# Patient Record
Sex: MALE | HISPANIC OR LATINO | URBAN - METROPOLITAN AREA
[De-identification: names, ages, dates, MRNs, and addresses within clinical notes are randomized per-mention and may not be internally consistent; named-entity substitution may affect disease eponyms.]

---
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
## 2021-01-03 ENCOUNTER — HEALTH MAINTENANCE LETTER (OUTPATIENT)
Age: 21
End: 2021-01-03

## 2021-01-07 DIAGNOSIS — L40.9 PSORIASIS: ICD-10-CM

## 2021-01-07 NOTE — TELEPHONE ENCOUNTER
[facility] Call Center    Phone Message    May a detailed message be left on voicemail: yes     Reason for Call: Medication Refill Request    Has the patient contacted the pharmacy for the refill? Yes     Name of medication being requested: Humira injection    Provider who prescribed the medication: Dr. Floyd    Pharmacy: [facility] - [place], [place] - [address]    Date medication is needed: OUT        Action Taken: Message routed to:  [facility] DERM    Travel Screening: Not Applicable

## 2021-01-07 NOTE — TELEPHONE ENCOUNTER
Humira injection      Patient has two active orders from the same day with two different doses. 10-    Last Office Visit : 10-  Future Office visit:  1-    Routing refill request to provider for review/approval because:  Not on protocol.  2 different doses active on medication list.      Kathleen M Doege RN

## 2021-01-08 NOTE — TELEPHONE ENCOUNTER
Medication refill request received and reviewed. Patient requesting refill of humira.  Last visit from 12/2020 was reviewed. At that point, plan was to continue on humira which patient had already started.  Will refill medication. Should not need a refill of starter pack    Sarah Beth Lara  PGY-4 Dermatology Resident  [facility] Department of Dermatology   [phone]

## 2021-01-14 ENCOUNTER — VIRTUAL VISIT (OUTPATIENT)
Dept: DERMATOLOGY | Facility: CLINIC | Age: 21
End: 2021-01-14
Payer: COMMERCIAL

## 2021-01-14 DIAGNOSIS — L63.9 ALOPECIA AREATA: ICD-10-CM

## 2021-01-14 DIAGNOSIS — L40.9 PSORIASIS: Primary | ICD-10-CM

## 2021-01-14 PROCEDURE — 99214 OFFICE O/P EST MOD 30 MIN: CPT | Mod: 95 | Performed by: STUDENT IN AN ORGANIZED HEALTH CARE EDUCATION/TRAINING PROGRAM

## 2021-01-14 ASSESSMENT — PAIN SCALES - GENERAL: PAINLEVEL: NO PAIN (0)

## 2021-01-14 NOTE — NURSING NOTE
Dermatology Rooming Note    Sulaiman Mccray's goals for this visit include:   Chief Complaint   Patient presents with     Derm Problem     Psoriasis - Sulaiman states he has been a little bit better      Carlito Barker, CMA

## 2021-01-14 NOTE — LETTER
1/14/2021       RE: uSlaiman Mccray  [address] [address]     Dear Colleague,    Thank you for referring your patient, Sulaiman Mccray, to the [facility]MAT[facility] [place] at [facility]. Please see a copy of my visit note below.    [facility] Dermatology Note  Encounter Date: Jan 14, 2021  [employer] and Telephone ([phone]). Location of teledermatologist: [facility]MATOLOGY [facility] [place]. Date of images: 01/14/21. Start time: 916. End time: 926.    Dermatology Problem List:    [facility]     1. Non-scarring hair loss (alopecia areata vs psoriatic alopecia)  - s/p punch bx 12/30/19 to scalp  - s/p ILK-10 scalp 10/22/20 (2cc)  - Current Tx: adalimumab for psoriasis  - Adjunct Tx: ferrous sulfate 325mg daily, vit C 100mg daily, and Vit D 50k units weekly x6wks (started 10/27/20)  - Prior tx: Doxycycline 100 mg BID (stopped 2/2 GI upset), hibiclens 3x/week, clobetasol 0.05% ointment/cream, triamcinolone 0.1% ointment, ILK qmonth x 8-9 months at [facility] [place] as well as allergic contact dermatitis sensitization  2. Psoriasis  - Punch bx on scalp noted the possibility of sebopsoriasis. Condition more apparent clinically 10/22/20  - Current Tx: triamcinolone 0.1% ointment BID  - Pending Tx: adalimumab               - Safety monitoring labs: stable              - Last QuantGold (10/22/20): negative  3. Bacterial superinfection on scalp  - Current Tx: BPO wash  - s/p Keflex 500mg BID   4. Elevated AST/ALT  - RUQ US pending    ____________________________________________    Assessment & Plan:  1. Psoriasis - subacute - stable  2. Non-scarring alopecia (alopecia areata vs psoriatic alopecia) - chronic, improving  Notable improvement with humira initiation. Plaques on scalp have thinned. He remains active on trunk. We discussed possibility of addition of nbUVB given guttate morphology on trunk;  however, given response, patient would like to consider and we can re-address at follow-up.  - Continue adalimumab q2w  - Continue triamcinolone 0.1% ointment BID to scalp  - Continue adjunct Tx: ferrous sulfate 325mg daily, vit C 100mg daily, and Vit D 50k units weekly x6wks (started 10/27/20)  - Reviewed baseline labs and stable w/ negative quant  - Consider nbUVB in future pending response to the above     3. Elevated LFTs  - Most likely related to fatty liver. Again recommended to follow up with RUQ US  - Pending results above, would consider GI consult     Follow-up in 12 weeks, earlier for new or changing lesions.      Staff Involved:  Patient was seen and staffed with attending physician Dr. Floyd     Procedures Performed:   None    Follow-up: 3 month(s) virtually (telephone with photos), or earlier for new or changing lesions    Staff and Resident:     Venancio Weinberg MD  Internal Medicine - Dermatology PGY 2  [phone]  IKristal,  was with the resident on the (phone/video) visit (for the critical and key portions or for 10 minutes) and agree with the resident's findings and plan of care as documented in the resident's note    ____________________________________________    CC: Derm Problem (Psoriasis - Sulaiman states he has been a little bit better )      HPI:  Mr. Sulaiman Mccray is a(n) 20 year old male who presents today as a return patient for psoriasis and ?psoriatic alopecia vs AA. Thinning of plaques with less scale. Agrees his body remains active. He notes no difficulty with the injections and no adverse reactions or recent infections. He is overall pleased with his progress.    Patient is otherwise feeling well, without additional concerns.    ROS: Skin Establ Pt: The patient denies any new rash, pruritus, or lesions that are symptomatic, changing or bleeding, except as per HPI.    Labs:  CBC, CMP, Quant reviewed.    Physical Exam:  SKIN: Teledermatology photos were reviewed;  image quality and interpretability: acceptable.   - Thinning of plaques on scalp; difficult to appreciate regrowth  - Trunk and extremities with well demarcated pink plaques and scattered psoriasiform guttate lesions - ?decreased thickness and scale but remains active  - No other lesions of concern on areas examined.     Medications:  Current Outpatient Medications   Medication     adalimumab (HUMIRA *CF*) 40 MG/0.4ML pen kit     benzoyl peroxide (PANOXYL) 10 % external liquid     cholecalciferol (VITAMIN D-1000 MAX ST) 1000 units TABS     ferrous sulfate (FEROSUL) 325 (65 Fe) MG tablet     fexofenadine (ALLEGRA) 180 MG tablet     triamcinolone (KENALOG) 0.1 % external ointment     vitamin C (ASCORBIC ACID) 100 MG tablet     adalimumab (HUMIRA *CF*) 80 MG/0.8ML pen kit     chlorhexidine (HIBICLENS) 4 % liquid     clobetasol (TEMOVATE) 0.05 % external ointment     triamcinolone (KENALOG) 0.1 % external ointment     Current Facility-Administered Medications   Medication     triamcinolone acetonide (KENALOG-10) injection 20 mg     triamcinolone acetonide (KENALOG-10) injection 20 mg     triamcinolone acetonide (KENALOG-10) injection 30 mg      Past Medical/Surgical History:   Patient Active Problem List   Diagnosis     ADHD (attention deficit hyperactivity disorder)     Alopecia areata     Anxiety     Major depressive disorder, single episode, mild (H)     Suicidal ideation     No past medical history on file.    CC Referred Self, MD  No address on file on close of this encounter.

## 2021-01-14 NOTE — PROGRESS NOTES
[facility] Dermatology Note  Encounter Date: Jan 14, 2021  Store-and-Forward and Telephone ([phone]). Location of teledermatologist: [facility] [place]. Date of images: 01/14/21. Start time: 916. End time: 926.    Dermatology Problem List:    We[facility]     1. Non-scarring hair loss (alopecia areata vs psoriatic alopecia)  - s/p punch bx 12/30/19 to scalp  - s/p ILK-10 scalp 10/22/20 (2cc)  - Current Tx: adalimumab for psoriasis  - Adjunct Tx: ferrous sulfate 325mg daily, vit C 100mg daily, and Vit D 50k units weekly x6wks (started 10/27/20)  - Prior tx: Doxycycline 100 mg BID (stopped 2/2 GI upset), hibiclens 3x/week, clobetasol 0.05% ointment/cream, triamcinolone 0.1% ointment, ILK qmonth x 8-9 months at [facility] [place] as well as allergic contact dermatitis sensitization  2. Psoriasis  - Punch bx on scalp noted the possibility of sebopsoriasis. Condition more apparent clinically 10/22/20  - Current Tx: triamcinolone 0.1% ointment BID  - Pending Tx: adalimumab               - Safety monitoring labs: stable              - Last QuantGold (10/22/20): negative  3. Bacterial superinfection on scalp  - Current Tx: BPO wash  - s/p Keflex 500mg BID   4. Elevated AST/ALT  - RUQ US pending    ____________________________________________    Assessment & Plan:  1. Psoriasis - subacute - stable  2. Non-scarring alopecia (alopecia areata vs psoriatic alopecia) - chronic, improving  Notable improvement with humira initiation. Plaques on scalp have thinned. He remains active on trunk. We discussed possibility of addition of nbUVB given guttate morphology on trunk; however, given response, patient would like to consider and we can re-address at follow-up.  - Continue adalimumab q2w  - Continue triamcinolone 0.1% ointment BID to scalp  - Continue adjunct Tx: ferrous sulfate 325mg daily, vit C 100mg daily, and Vit D 50k units weekly x6wks (started 10/27/20)  - Reviewed baseline labs  and stable w/ negative quant  - Consider nbUVB in future pending response to the above     3. Elevated LFTs  - Most likely related to fatty liver. Again recommended to follow up with RUQ US  - Pending results above, would consider GI consult     Follow-up in 12 weeks, earlier for new or changing lesions.      Staff Involved:  Patient was seen and staffed with attending physician Dr. Floyd     Procedures Performed:   None    Follow-up: 3 month(s) virtually (telephone with photos), or earlier for new or changing lesions    Staff and Resident:     Venancio Weinberg MD  Internal Medicine - Dermatology PGY 2  [phone]  I, Kristal Floyd,  was with the resident on the (phone/video) visit (for the critical and key portions or for 10 minutes) and agree with the resident's findings and plan of care as documented in the resident's note    ____________________________________________    CC: Derm Problem (Psoriasis - Sulaiman states he has been a little bit better )      HPI:  Mr. Sulaiman Mccray is a(n) 20 year old male who presents today as a return patient for psoriasis and ?psoriatic alopecia vs AA. Thinning of plaques with less scale. Agrees his body remains active. He notes no difficulty with the injections and no adverse reactions or recent infections. He is overall pleased with his progress.    Patient is otherwise feeling well, without additional concerns.    ROS: Skin Establ Pt: The patient denies any new rash, pruritus, or lesions that are symptomatic, changing or bleeding, except as per HPI.    Labs:  CBC, CMP, Quant reviewed.    Physical Exam:  SKIN: Teledermatology photos were reviewed; image quality and interpretability: acceptable.   - Thinning of plaques on scalp; difficult to appreciate regrowth  - Trunk and extremities with well demarcated pink plaques and scattered psoriasiform guttate lesions - ?decreased thickness and scale but remains active  - No other lesions of concern on areas examined.      Medications:  Current Outpatient Medications   Medication     adalimumab (HUMIRA *CF*) 40 MG/0.4ML pen kit     benzoyl peroxide (PANOXYL) 10 % external liquid     cholecalciferol (VITAMIN D-1000 MAX ST) 1000 units TABS     ferrous sulfate (FEROSUL) 325 (65 Fe) MG tablet     fexofenadine (ALLEGRA) 180 MG tablet     triamcinolone (KENALOG) 0.1 % external ointment     vitamin C (ASCORBIC ACID) 100 MG tablet     adalimumab (HUMIRA *CF*) 80 MG/0.8ML pen kit     chlorhexidine (HIBICLENS) 4 % liquid     clobetasol (TEMOVATE) 0.05 % external ointment     triamcinolone (KENALOG) 0.1 % external ointment     Current Facility-Administered Medications   Medication     triamcinolone acetonide (KENALOG-10) injection 20 mg     triamcinolone acetonide (KENALOG-10) injection 20 mg     triamcinolone acetonide (KENALOG-10) injection 30 mg      Past Medical/Surgical History:   Patient Active Problem List   Diagnosis     ADHD (attention deficit hyperactivity disorder)     Alopecia areata     Anxiety     Major depressive disorder, single episode, mild (H)     Suicidal ideation     No past medical history on file.    CC Referred Self, MD  No address on file on close of this encounter.

## 2021-01-14 NOTE — PATIENT INSTRUCTIONS
[facility] Dermatology Visit    Thank you for allowing us to participate in your care. Your findings, instructions and follow-up plan are as follows:     - Lets continue the humira (adalimumab)  - We can always add light box therapy in future. Either at your next visit or earlier if you send us a message.  - We will see you back in 3 months.    When should I call my doctor?    If you are worsening or not improving, please, contact us or seek urgent care as noted below.     Who should I call with questions (adults)?    [facility] (adult and pediatric): [phone]     [facility] (adult): [phone]    For urgent needs outside of business hours call the [facility] at [phone] and ask for the dermatology resident on call    If this is a medical emergency and you are unable to reach an ER, Call [phone]      Who should I call with questions (pediatric)?  [facility]- Pediatric Dermatology  Dr. Mela Snowden, Dr. Samson Vigil, Dr. Jena Merchant, Faina Cortez, PA  Dr. Ev Biggs, Dr. Dorothy Davis & Dr. Сергей Garvin  Non Urgent  Nurse Triage Line; [phone]- Nancy and Delores FERNANDO Care Coordinators   Jazmyn (/Complex ) [phone]    If you need a prescription refill, please contact your pharmacy. Refills are approved or denied by our Physicians during normal business hours, Monday through Fridays  Per office policy, refills will not be granted if you have not been seen within the past year (or sooner depending on your child's condition)    Scheduling Information:  Pediatric Appointment Scheduling and Call Center [phone]  Radiology Scheduling- [phone]  Sedation Unit Scheduling- [phone]  [place] Scheduling- General [phone]; Pediatric Dermatology [phone]  Main  Services: [phone]  Greek: [phone]  Amy:  [phone]  Justin/Jt/Peruvian: [phone]  Preadmission Nursing Department Fax Number: [phone] (Fax all pre-operative paperwork to this number)    For urgent matters arising during evenings, weekends, or holidays that cannot wait for normal business hours please call [phone] and ask for the Dermatology Resident On-Call to be paged.

## 2021-01-22 ENCOUNTER — APPOINTMENT (OUTPATIENT)
Dept: INTERPRETER SERVICES | Facility: CLINIC | Age: 21
End: 2021-01-22

## 2021-01-22 ENCOUNTER — TELEPHONE (OUTPATIENT)
Dept: DERMATOLOGY | Facility: CLINIC | Age: 21
End: 2021-01-22

## 2021-01-22 NOTE — TELEPHONE ENCOUNTER
[facility] Call Center    Phone Message    May a detailed message be left on voicemail: yes     Reason for Call: Appointment Intake    Referring Provider Name:   Return Pt for Dr. Davis    Diagnosis and/or Symptoms:   Follow up appt with Dr. Davis for Alopecia  and Psoriasis    Action Taken: Message routed to:  [facility] Dermatology    Travel Screening: Not Applicable           Pt's mother, Yuliya Brandon, called to set up Follow-up appt with Dr. Davis for her son. Scheduling protocols show that return Pts scheduling requests should be sent by TE to clinic.  Please return call to Pt's mom to schedule. Note that the return call needs a .    Thank you-

## 2021-02-12 ENCOUNTER — APPOINTMENT (OUTPATIENT)
Dept: INTERPRETER SERVICES | Facility: CLINIC | Age: 21
End: 2021-02-12

## 2021-02-12 ENCOUNTER — TELEPHONE (OUTPATIENT)
Dept: DERMATOLOGY | Facility: CLINIC | Age: 21
End: 2021-02-12

## 2021-02-12 NOTE — TELEPHONE ENCOUNTER
[facility] Call Center    Phone Message    May a detailed message be left on voicemail: yes     Reason for Call: Medication Refill Request    Has the patient contacted the pharmacy for the refill? Yes     Name of medication being requested: adalimumab (HUMIRA *CF*) 40 MG/0.4ML pen kit    Provider who prescribed the medication: Dr. Lara    Pharmacy: [facility] - [facility] [address]     Date medication is needed: OUT - will need it by ASAP because the next injection is on Tuesday. Thanks      Action Taken: Message routed to:  [facility] DERM    Travel Screening: Not Applicable

## 2021-02-12 NOTE — TELEPHONE ENCOUNTER
Pharmacy was called - they have refills available - however the patient needs to call them and request the delivery - they will not take the request from caller.   Attempted to call the patient with  - however voice mail is full. Will attempt to call again later.      Kathleen M Doege RN

## 2021-02-16 ENCOUNTER — APPOINTMENT (OUTPATIENT)
Dept: INTERPRETER SERVICES | Facility: CLINIC | Age: 21
End: 2021-02-16

## 2021-02-16 NOTE — TELEPHONE ENCOUNTER
Attempted to call with  again - still no answer and voice mail box is full.  Language Services states  is needed only for mom. Patient prefers English. Will attempt to call again this evening.

## 2021-02-18 ENCOUNTER — APPOINTMENT (OUTPATIENT)
Dept: INTERPRETER SERVICES | Facility: CLINIC | Age: 21
End: 2021-02-18

## 2021-04-05 ENCOUNTER — APPOINTMENT (OUTPATIENT)
Dept: INTERPRETER SERVICES | Facility: CLINIC | Age: 21
End: 2021-04-05
Payer: COMMERCIAL

## 2021-04-05 DIAGNOSIS — L40.9 PSORIASIS: Primary | ICD-10-CM

## 2021-04-07 NOTE — TELEPHONE ENCOUNTER
adalimumab (HUMIRA *CF*) 40 MG/0.4ML pen kit   Inject 0.4 mLs (40 mg) Subcutaneous every 14 days - Last Written Prescription Date:  1/8/21  Last Fill Quantity: 0.8 ml,   # refills: 2  Last Office Visit : 1/14/21  Continue adalimumab q2w  Follow-up in 12 weeks, earlier for new or changing lesions.   Future Office visit:  4/30/21    Routing refill request to provider for review/approval because:  Drug not on the [facility], [facility] or [facility] refill protocol

## 2021-04-09 DIAGNOSIS — L40.9 PSORIASIS: ICD-10-CM

## 2021-04-09 NOTE — TELEPHONE ENCOUNTER
Received refill request for adalimumab q14d. Patient is in my CC panel. Patient last seen 1/14/21 for psoriasis and alopecia. RTC is 4/30/21.     After reviewing the medication list and assessment and plan from last visit, the refill request was accepted.    CC'ing Dr. Davis as IVONE Culver MD  Med/Derm PGY-3  P: [phone]

## 2021-04-13 NOTE — TELEPHONE ENCOUNTER
"adalimumab (HUMIRA *CF*) 40 MG/0.4ML pen kit   Inject 0.4 mLs (40 mg) Subcutaneous every 14 days -     Last Written Prescription Date:  4/9/21  Last Fill Quantity: 0.8 ml,   # refills: 5  Last Office Visit : 1/14/21  Future Office visit:  4/30/21    Routing refill request to provider for review/approval because:  Duplicate: confirmed receipt with pharmacy. \"Request discarded\".       "

## 2021-04-30 ENCOUNTER — OFFICE VISIT (OUTPATIENT)
Dept: DERMATOLOGY | Facility: CLINIC | Age: 21
End: 2021-04-30
Payer: COMMERCIAL

## 2021-04-30 DIAGNOSIS — L63.9 ALOPECIA AREATA: ICD-10-CM

## 2021-04-30 DIAGNOSIS — R79.89 LOW VITAMIN D LEVEL: ICD-10-CM

## 2021-04-30 DIAGNOSIS — L40.9 PSORIASIS: Primary | ICD-10-CM

## 2021-04-30 PROCEDURE — 99214 OFFICE O/P EST MOD 30 MIN: CPT | Performed by: DERMATOLOGY

## 2021-04-30 ASSESSMENT — PAIN SCALES - GENERAL: PAINLEVEL: SEVERE PAIN (7)

## 2021-04-30 NOTE — PROGRESS NOTES
[facility] Dermatology Note  Encounter Date: Apr 30, 2021  Office Visit     Dermatology Problem List:  1. Non-scarring hair loss (alopecia areata vs psoriatic alopecia)  - s/p punch bx 12/30/19 to scalp  - s/p ILK-10 scalp 10/22/20 (2c)  - Current Tx: adalimumab for psoriasis, Vit D 50k units, clobetasol 0.05% ointment  - Pending Tx: Taltz   - Prior tx: Doxycycline 100 mg BID (stopped 2/2 GI upset), hibiclens 3x/week, triamcinolone 0.1% ointment, ILK qmonth x 8-9 months at [facility] [place] as well as allergic contact dermatitis sensitization, ferrous sulfate 325mg daily, Vitamin C 100mg daily   2. Psoriasis  - Punch bx on scalp noted the possibility of sebopsoriasis. Condition more apparent clinically 10/22/20 and again today 4/30/21  - Current Tx: clobetasol 0.05% ointment, adalimumab   - Pending Tx: Taltz               - Last QuantGold (10/22/20): negative  - Prior Tx: triamcinolone 0.1% ointment  3. Bacterial superinfection on scalp  - Current Tx: BPO wash  - s/p Keflex 500mg BID   4. Elevated AST/ALT  - RUQ US pending    ____________________________________________    Assessment & Plan:    1. Psoriasis - worsened   2. Non-scarring alopecia    - alopecia areata - chronic. Notable improvement initially with humira initiation but now still active and flaring on the scalp and extremities.   - Patient failed adalimumab q2w. Continue adalimumab until follow up. Will plan to start Taltz at follow up.   - Hold triamcinolone 0.1% ointment BID to scalp  - Hold ferrous sulfate 325mg daily and Vit C 100mg daily  - Restart Vit D 50k units daily. Reviewed levels from 10/2020 - low at 12. Recheck levels in 1-2 months.   - Start clobetasol 0.05% ointment, apply to scalp and lesions on the extremities   - Consider nbUVB in future      3. Elevated LFTs, not addressed today   - Most likely related to fatty liver. Again recommended to follow up with RUQ US. Consider GI consult       Procedures Performed:    None      Follow-up: 6 weeks with Dr. Culver in continuity clinic, earlier for new or changing lesions.     Staff and Scribe:     Scribe Disclosure  I, Katelyn Ramonvalencia, am serving as a scribe to document services personally performed by Dr. Dorothy Davis MD, based on data collection and the provider's statements to me.     Provider Disclosure:   The documentation recorded by the scribe accurately reflects the services I personally performed and the decisions made by me.    Dorothy Davis MD  Professor and Chair  Department of Dermatology  [facility]: Phone: [phone], Fax:[phone]  [facility]: Phone: [phone], Fax: [phone]      ____________________________________________    CC: Derm Problem (Sulaiman is here for a follow up on alopecia and psoriasis.)    HPI:  Mr. Sulaiman Mccray is a(n) 21 year old male who presents today as a return patient for alopecia areata and psoriasis. The patient was last seen virtually on 1/14/2021.     Today, the patient reports that his psoriasis is getting worse and is very dry on areas including the scalp, arms, and legs. He is doing the adalimumab injections every two weeks, but these are not helping. Patient is applying triamcinolone ointment twice daily to the body, not the scalp. Patient is no longer taking ferrous sulfate 325mg, vit C 100mg daily, or Vit D 50k units. Patient and mom would like to consider other options and would like these plaques to resolve.     Patient is otherwise feeling well, without additional skin concerns.    Labs Reviewed:  Lab 10/2020: Vitamin D level low at 12.     Physical Exam:  Vitals: There were no vitals taken for this visit.  SKIN: Focused examination of the scalp, face, and arms was performed.  - Large confluent psoriatic plaques on the bilateral elbows with significant scale  - Multiple 2-3 cm psoriatic  plaques scattered throughout the scalp in the areas of alopecia   - No other lesions of concern on areas examined.     Medications:  Current Outpatient Medications   Medication     adalimumab (HUMIRA *CF*) 40 MG/0.4ML pen kit     adalimumab (HUMIRA *CF*) 40 MG/0.4ML pen kit     benzoyl peroxide (PANOXYL) 10 % external liquid     fexofenadine (ALLEGRA) 180 MG tablet     triamcinolone (KENALOG) 0.1 % external ointment     triamcinolone (KENALOG) 0.1 % external ointment     adalimumab (HUMIRA *CF*) 80 MG/0.8ML pen kit     chlorhexidine (HIBICLENS) 4 % liquid     cholecalciferol (VITAMIN D-1000 MAX ST) 1000 units TABS     clobetasol (TEMOVATE) 0.05 % external ointment     ferrous sulfate (FEROSUL) 325 (65 Fe) MG tablet     vitamin C (ASCORBIC ACID) 100 MG tablet     Current Facility-Administered Medications   Medication     triamcinolone acetonide (KENALOG-10) injection 20 mg     triamcinolone acetonide (KENALOG-10) injection 20 mg     triamcinolone acetonide (KENALOG-10) injection 30 mg      Past Medical History:   Patient Active Problem List   Diagnosis     ADHD (attention deficit hyperactivity disorder)     Alopecia areata     Anxiety     Major depressive disorder, single episode, mild (H)     Suicidal ideation     No past medical history on file.     CC No referring provider defined for this encounter. on close of this encounter.

## 2021-04-30 NOTE — NURSING NOTE
Chief Complaint   Patient presents with     Derm Problem     Sulaiman is here for a follow up on alopecia and psoriasis.   Nery Garcia on 4/30/2021 at 11:39 AM

## 2021-04-30 NOTE — LETTER
4/30/2021       RE: Sulaiman Mccray  [address] [facility] [address]     Dear Colleague,    Thank you for referring your patient, Sulaiman Mccray, to the [facility]ERMATOLOG[facility] [place] [facility]. Please see a copy of my visit note below.    [facility] Dermatology Note  Encounter Date: Apr 30, 2021  Office Visit     Dermatology Problem List:  1. Non-scarring hair loss (alopecia areata vs psoriatic alopecia)  - s/p punch bx 12/30/19 to scalp  - s/p ILK-10 scalp 10/22/20 (2cc)  - Current Tx: adalimumab for psoriasis, Vit D 50k units, clobetasol 0.05% ointment  - Pending Tx: Taltz   - Prior tx: Doxycycline 100 mg BID (stopped 2/2 GI upset), hibiclens 3x/week, triamcinolone 0.1% ointment, ILK qmonth x 8-9 months at [facility] as well as allergic contact dermatitis sensitization, ferrous sulfate 325mg daily, Vitamin C 100mg daily   2. Psoriasis  - Punch bx on scalp noted the possibility of sebopsoriasis. Condition more apparent clinically 10/22/20 and again today 4/30/21  - Current Tx: clobetasol 0.05% ointment, adalimumab   - Pending Tx: Taltz               - Last QuantGold (10/22/20): negative  - Prior Tx: triamcinolone 0.1% ointment  3. Bacterial superinfection on scalp  - Current Tx: BPO wash  - s/p Keflex 500mg BID   4. Elevated AST/ALT  - RUQ US pending    ____________________________________________    Assessment & Plan:    1. Psoriasis - worsened   2. Non-scarring alopecia    - alopecia areata - chronic. Notable improvement initially with humira initiation but now still active and flaring on the scalp and extremities.   - Patient failed adalimumab q2w. Continue adalimumab until follow up. Will plan to start Taltz at follow up.   - Hold triamcinolone 0.1% ointment BID to scalp  - Hold ferrous sulfate 325mg daily and Vit C 100mg daily  - Restart Vit D 50k units daily. Reviewed levels from 10/2020 - low at 12. Recheck  levels in 1-2 months.   - Start clobetasol 0.05% ointment, apply to scalp and lesions on the extremities   - Consider nbUVB in future      3. Elevated LFTs, not addressed today   - Most likely related to fatty liver. Again recommended to follow up with RUQ US. Consider GI consult       Procedures Performed:   None      Follow-up: 6 weeks with Dr. Culver in continuity clinic, earlier for new or changing lesions.     Staff and Scribe:     Scribe Disclosure  I, Katelyn Loja, am serving as a scribe to document services personally performed by Dr. Dorothy Davis MD, based on data collection and the provider's statements to me.     Provider Disclosure:   The documentation recorded by the scribe accurately reflects the services I personally performed and the decisions made by me.    Dorothy Davis MD  Professor and Chair  Department of Dermatology  [facility]: Phone: [phone], Fax:[phone]  [facility]: Phone: [phone], Fax: [phone]      ____________________________________________    CC: Derm Problem (Sulaiman is here for a follow up on alopecia and psoriasis.)    HPI:  Mr. Sulaiman Mccray is a(n) 21 year old male who presents today as a return patient for alopecia areata and psoriasis. The patient was last seen virtually on 1/14/2021.     Today, the patient reports that his psoriasis is getting worse and is very dry on areas including the scalp, arms, and legs. He is doing the adalimumab injections every two weeks, but these are not helping. Patient is applying triamcinolone ointment twice daily to the body, not the scalp. Patient is no longer taking ferrous sulfate 325mg, vit C 100mg daily, or Vit D 50k units. Patient and mom would like to consider other options and would like these plaques to resolve.     Patient is otherwise feeling well, without additional skin  concerns.    Labs Reviewed:  Lab 10/2020: Vitamin D level low at 12.     Physical Exam:  Vitals: There were no vitals taken for this visit.  SKIN: Focused examination of the scalp, face, and arms was performed.  - Large confluent psoriatic plaques on the bilateral elbows with significant scale  - Multiple 2-3 cm psoriatic plaques scattered throughout the scalp in the areas of alopecia   - No other lesions of concern on areas examined.     Medications:  Current Outpatient Medications   Medication     adalimumab (HUMIRA *CF*) 40 MG/0.4ML pen kit     adalimumab (HUMIRA *CF*) 40 MG/0.4ML pen kit     benzoyl peroxide (PANOXYL) 10 % external liquid     fexofenadine (ALLEGRA) 180 MG tablet     triamcinolone (KENALOG) 0.1 % external ointment     triamcinolone (KENALOG) 0.1 % external ointment     adalimumab (HUMIRA *CF*) 80 MG/0.8ML pen kit     chlorhexidine (HIBICLENS) 4 % liquid     cholecalciferol (VITAMIN D-1000 MAX ST) 1000 units TABS     clobetasol (TEMOVATE) 0.05 % external ointment     ferrous sulfate (FEROSUL) 325 (65 Fe) MG tablet     vitamin C (ASCORBIC ACID) 100 MG tablet     Current Facility-Administered Medications   Medication     triamcinolone acetonide (KENALOG-10) injection 20 mg     triamcinolone acetonide (KENALOG-10) injection 20 mg     triamcinolone acetonide (KENALOG-10) injection 30 mg      Past Medical History:   Patient Active Problem List   Diagnosis     ADHD (attention deficit hyperactivity disorder)     Alopecia areata     Anxiety     Major depressive disorder, single episode, mild (H)     Suicidal ideation     No past medical history on file.     CC No referring provider defined for this encounter. on close of this encounter.      Again, thank you for allowing me to participate in the care of your patient.      Sincerely,    Dorothy Davis MD

## 2021-05-02 RX ORDER — CLOBETASOL PROPIONATE 0.5 MG/G
OINTMENT TOPICAL 2 TIMES DAILY
Qty: 45 G | Refills: 3 | Status: SHIPPED | OUTPATIENT
Start: 2021-05-02 | End: 2021-06-17

## 2021-05-07 ENCOUNTER — APPOINTMENT (OUTPATIENT)
Dept: INTERPRETER SERVICES | Facility: CLINIC | Age: 21
End: 2021-05-07
Payer: COMMERCIAL

## 2021-05-18 ENCOUNTER — TELEPHONE (OUTPATIENT)
Dept: DERMATOLOGY | Facility: CLINIC | Age: 21
End: 2021-05-18

## 2021-05-19 NOTE — TELEPHONE ENCOUNTER
Orders for admission, patient is aware of plan and ready to go upstairs. Any questions, please call ED RN [phone]  at [facility].    Type of COVID test sent:Rapid  COVID Suspicion level: Low    Titratable drug(s) infusing:none  Rate:    LOC at time o PRIOR AUTHORIZATION DENIED    Medication: taltz    Denial Date: 5/19/2021    Denial Rational: patient must try and fail Enbrel

## 2021-05-21 ENCOUNTER — APPOINTMENT (OUTPATIENT)
Dept: INTERPRETER SERVICES | Facility: CLINIC | Age: 21
End: 2021-05-21
Payer: COMMERCIAL

## 2021-05-25 ENCOUNTER — APPOINTMENT (OUTPATIENT)
Dept: INTERPRETER SERVICES | Facility: CLINIC | Age: 21
End: 2021-05-25
Payer: COMMERCIAL

## 2021-05-25 DIAGNOSIS — L40.9 PSORIASIS: Primary | ICD-10-CM

## 2021-05-26 ENCOUNTER — TELEPHONE (OUTPATIENT)
Dept: DERMATOLOGY | Facility: CLINIC | Age: 21
End: 2021-05-26

## 2021-05-26 NOTE — TELEPHONE ENCOUNTER
PA Initiation    Medication: Enbrel  Insurance Company: [employer] - Phone [phone] Fax [phone]  Pharmacy Filling the Rx: [facility] - [place], [place] - [facility] [address]  Filling Pharmacy Phone:    Filling Pharmacy Fax:    Start Date: 5/26/2021    M Key [id number]

## 2021-05-26 NOTE — TELEPHONE ENCOUNTER
Medication changed to Enbrel. I am closing this encounter and starting a new one for the Enbrel.    Sylvia

## 2021-05-26 NOTE — PROGRESS NOTES
Received message that patient has had worsening of psoriasis despite being on adalimumab. Prior auth for Taltz failed due to insurance requiring trial of enbrel. Will begin prior auth process for enbrel     Plan:  - Ordered both loading and maintenance doses of enbrel autoinjector pens  - f/u is scheduled for 6/17/21    Plan was formulated with attending physician, Dr. Ryan Culver MD  Med/Derm PGY-3  P: [phone]

## 2021-05-27 NOTE — TELEPHONE ENCOUNTER
Prior Authorization Approval    Authorization Effective Date: 4/26/2021  Authorization Expiration Date: 8/24/2021  Medication: Enbrel  Approved Dose/Quantity:   Reference #: CMM Key [id number]   Insurance Company: [employer] - Phone [phone] Fax [phone]  Expected CoPay:       CoPay Card Available:      Foundation Assistance Needed:    Which Pharmacy is filling the prescription (Not needed for infusion/clinic administered): [facility] - [place] [address]  Pharmacy Notified: Yes  Patient Notified: Yes

## 2021-06-07 ENCOUNTER — APPOINTMENT (OUTPATIENT)
Dept: INTERPRETER SERVICES | Facility: CLINIC | Age: 21
End: 2021-06-07
Payer: COMMERCIAL

## 2021-06-08 ENCOUNTER — APPOINTMENT (OUTPATIENT)
Dept: INTERPRETER SERVICES | Facility: CLINIC | Age: 21
End: 2021-06-08
Payer: COMMERCIAL

## 2021-06-09 ENCOUNTER — APPOINTMENT (OUTPATIENT)
Dept: INTERPRETER SERVICES | Facility: CLINIC | Age: 21
End: 2021-06-09
Payer: COMMERCIAL

## 2021-06-17 ENCOUNTER — OFFICE VISIT (OUTPATIENT)
Dept: DERMATOLOGY | Facility: CLINIC | Age: 21
End: 2021-06-17
Payer: COMMERCIAL

## 2021-06-17 DIAGNOSIS — R79.89 LOW VITAMIN D LEVEL: ICD-10-CM

## 2021-06-17 DIAGNOSIS — L63.9 ALOPECIA AREATA: ICD-10-CM

## 2021-06-17 DIAGNOSIS — L40.9 PSORIASIS: Primary | ICD-10-CM

## 2021-06-17 PROCEDURE — 11901 INJECT SKIN LESIONS >7: CPT | Mod: GC | Performed by: STUDENT IN AN ORGANIZED HEALTH CARE EDUCATION/TRAINING PROGRAM

## 2021-06-17 PROCEDURE — 99214 OFFICE O/P EST MOD 30 MIN: CPT | Mod: 25 | Performed by: STUDENT IN AN ORGANIZED HEALTH CARE EDUCATION/TRAINING PROGRAM

## 2021-06-17 RX ORDER — CLOBETASOL PROPIONATE 0.5 MG/G
OINTMENT TOPICAL 2 TIMES DAILY
Qty: 45 G | Refills: 3 | Status: SHIPPED | OUTPATIENT
Start: 2021-06-17 | End: 2022-06-02

## 2021-06-17 RX ORDER — TRIAMCINOLONE ACETONIDE 1 MG/G
OINTMENT TOPICAL 2 TIMES DAILY
Qty: 454 G | Refills: 3 | Status: SHIPPED | OUTPATIENT
Start: 2021-06-17

## 2021-06-17 ASSESSMENT — PAIN SCALES - GENERAL: PAINLEVEL: NO PAIN (0)

## 2021-06-17 NOTE — PROGRESS NOTES
[facility] Dermatology Note   Encounter Date: Jun 17, 2021  Office visit    Dermatology Problem List:    Lenard/Palomo CC    # Psoriasis (~60% BSA affected)  - Punch bx on scalp noted the possibility of sebopsoriasis  - s/p ILK-10 to arms 6/17/21 (2cc)  - Current Tx: enbrel and clobetasol              - Last QuantGold (10/22/20): negative  - Prior Tx: triamcinolone 0.1% ointment, humira (ineffective)  # Non-scarring alopecia (alopecia areata vs psoriatic alopecia or combination)  - s/p punch bx 12/30/19 to scalp  - s/p ILK-10 scalp 10/22/20 (2cc), 6/17/21 (2cc)  - Current Tx: enbrel (for psoriasis) and clobetasol ointment  - Prior Tx: humira (ineffective), Doxycycline (stopped 2/2 GI upset), hibiclens 3x/week, triamcinolone 0.1% ointment, ILK qmonth x 8-9 months at [facility] as well as allergic contact dermatitis sensitization, ferrous sulfate 325mg daily, Vitamin C 100mg daily   # Vitamin D insufficiency  - Last level (10/2020): 12; recheck at next office visit  - s/p Vit D 50k units x6wks (10/2020)  # Bacterial superinfection on scalp  - Current Tx: BPO wash  - s/p Keflex 500mg BID     ___________________________________________    Assessment & Plan:    # Psoriasis vulagris  # Non-scarring alopecia/alopecia areata  - Condition is stable since last visit with improvement in some areas. Will perform ILK to both the scalp and arm lesions and continue with current regimen  - Continue with enbrel BIW  - Continue with triamcinolone 0.1% ointment (body) and clobetasol (scalp)   - Refilled today    # Vit D insufficiency  - Will plan for recheck at next office visit     Procedures Performed:  - Kenalog intralesional injection procedure note: after verbal consent and discussion of risks including but not limited to atrophy, pain, and bruising, time out was performed, patient positioned, area cleaned with alcohol, 4 ml Kenalog 10 mg/ml injected into >7 site on scalp and forearms; patient tolerated procedure  well    Follow-up: 2 months    Staff Involved:  Patient was seen and staffed with attending physician Dr. Ryan Culver MD  Med/Derm Resident PGY-3  P:[phone]    Patient was seen and examined with the medicine/dermatology resident. I agree with the history, review of systems, physical examination, assessments and plan. I was present for the key portion of the ILK procedure.    Dorothy Davis MD  Professor and  Chair  Department of Dermatology  [facility]    ___________________________________________      CC: Derm Problem (alopecia areata vs psoriatic alopecia - Sulaiman states there has not been any hair regrowth)      HPI:  Mr. Sulaiman Mccray is a(n) 21 year old male who presents to clinic today for psoriasis and alopecia areata follow up  - Reports that his condition is roughly the same. Has been applying the clobetasol  - Has taken two doses of enbrel  - Has more psoriasis plaques on his arms, trunk, and legs  - otherwise feeling well in usual state of health    Physical exam:  General: in no acute distress, well-developed, well-nourished  Skin:  - skin type: brown  - multiple well-defined, erythematous plaques with overlying fine micaceous scale covering approximately 60% BSA  - minimal hair regrowth affecting scalp  - No other lesions of concern on areas examined.     Medications:  Current Outpatient Medications   Medication     clobetasol (TEMOVATE) 0.05 % external ointment     etanercept (ENBREL SURECLICK) 50 MG/ML autoinjector     etanercept (ENBREL SURECLICK) 50 MG/ML autoinjector     triamcinolone (KENALOG) 0.1 % external ointment     benzoyl peroxide (PANOXYL) 10 % external liquid     chlorhexidine (HIBICLENS) 4 % liquid     cholecalciferol (VITAMIN D-1000 MAX ST) 1000 units TABS     fexofenadine (ALLEGRA) 180 MG tablet     Current Facility-Administered Medications   Medication     triamcinolone acetonide (KENALOG-10) injection 20 mg     triamcinolone acetonide  (KENALOG-10) injection 20 mg     triamcinolone acetonide (KENALOG-10) injection 30 mg     triamcinolone acetonide (KENALOG-10) injection 40 mg      Past Medical History:   Patient Active Problem List   Diagnosis     ADHD (attention deficit hyperactivity disorder)     Alopecia areata     Anxiety     Major depressive disorder, single episode, mild (H)     Suicidal ideation     No past medical history on file.

## 2021-06-17 NOTE — PATIENT INSTRUCTIONS
1. Psoriasis  - Please use the triamcinolone ointment twice a day to the psoriasis plaques  - In addition, continue with the injectable enbrel medication  - we'll check labs for the vitamin D at your next appt  - can use the clobetasol to the thicker spots on your scalp and extremities

## 2021-06-17 NOTE — NURSING NOTE
Dermatology Rooming Note    Sulaiman Mccray's goals for this visit include:   Chief Complaint   Patient presents with     Derm Problem     alopecia areata vs psoriatic alopecia - Sulaiman states there has not been any hair regrowth     Carlito Barker CMA

## 2021-06-18 ENCOUNTER — APPOINTMENT (OUTPATIENT)
Dept: INTERPRETER SERVICES | Facility: CLINIC | Age: 21
End: 2021-06-18
Payer: COMMERCIAL

## 2021-06-23 ENCOUNTER — TELEPHONE (OUTPATIENT)
Dept: DERMATOLOGY | Facility: CLINIC | Age: 21
End: 2021-06-23

## 2021-06-23 NOTE — TELEPHONE ENCOUNTER
[facility]    Phone Message    May a detailed message be left on voicemail: yes     Reason for Call: Other: Pt's mom called and was wondering if you can mail out the list of appts the pt had between Jan 2020-April 2020. Pt needs it because pt's school will not give him the credits without showing them this. Please call pt's mom back if you have any questions. Thanks     Action Taken: Message routed to:  [facility]    Travel Screening: Not Applicable

## 2021-07-05 ENCOUNTER — APPOINTMENT (OUTPATIENT)
Dept: INTERPRETER SERVICES | Facility: CLINIC | Age: 21
End: 2021-07-05
Payer: COMMERCIAL

## 2021-07-05 ENCOUNTER — TELEPHONE (OUTPATIENT)
Dept: DERMATOLOGY | Facility: CLINIC | Age: 21
End: 2021-07-05
Payer: COMMERCIAL

## 2021-07-05 NOTE — TELEPHONE ENCOUNTER
[facility] Call Center    Phone Message    May a detailed message be left on voicemail: yes     Reason for Call: Medication Refill Request    Has the patient contacted the pharmacy for the refill? Yes     Name of medication being requested:  triamcinolone acetonide (KENALOG-10) injection 40 mg    Provider who prescribed the medication:   Dr Ryan Hernandez    Pharmacy:   [facility] - [address]    Date medication is needed: ASAP   Pt is aware of 72-business hour turn around time on refill requests.      Action Taken: Message routed to:  [facility] Derm    Travel Screening: Not Applicable     Pt's mother calling to refill most recent Rx for injectable Rx. States it is working well for her son. Refill requested

## 2021-07-23 ENCOUNTER — TELEPHONE (OUTPATIENT)
Dept: DERMATOLOGY | Facility: CLINIC | Age: 21
End: 2021-07-23

## 2021-07-23 ENCOUNTER — APPOINTMENT (OUTPATIENT)
Dept: INTERPRETER SERVICES | Facility: CLINIC | Age: 21
End: 2021-07-23
Payer: COMMERCIAL

## 2021-07-23 NOTE — TELEPHONE ENCOUNTER
[facility]    Phone Message    May a detailed message be left on voicemail: yes     Reason for Call: Medication Refill Request    Has the patient contacted the pharmacy for the refill? Yes   Name of medication being requested:  Injection  Provider who prescribed the medication: Dr. Davis  Pharmacy: [address] [place] [address]  [address] [place] [id number]  Pt has always had Rx send to Home address as given above. Pt's mom  Does not know the address or Pharmacy name. Please call Pt's mom to confirm Rx refill. Thank you     Date medication is needed: ASAP         Action Taken: Message routed to:  [facility] Derm    Travel Screening: Not Applicable

## 2021-07-23 NOTE — TELEPHONE ENCOUNTER
Tried calling back regarding below with  unable to reach patient and voicemail has not been set up.

## 2021-08-03 ENCOUNTER — TELEPHONE (OUTPATIENT)
Dept: DERMATOLOGY | Facility: CLINIC | Age: 21
End: 2021-08-03
Payer: COMMERCIAL

## 2021-08-03 NOTE — TELEPHONE ENCOUNTER
[facility]    Phone Message    May a detailed message be left on voicemail: yes     Reason for Call: Form or Letter   Type or form/letter needing completion:   Return to school     Provider:   Dr Davis    Date form needed: 8/13/21    Once completed: [place] [facility] [place]      Action Taken: Message routed to:  [facility]erm    Travel Screening: Not Applicable     Please complete a letter stating Pt can return to school. Pt requests form be [place] [facility] when complete.    Please call Pt with questions.    Thanks!

## 2021-08-24 ENCOUNTER — TELEPHONE (OUTPATIENT)
Dept: DERMATOLOGY | Facility: CLINIC | Age: 21
End: 2021-08-24

## 2021-08-24 NOTE — TELEPHONE ENCOUNTER
Writer left a message for Jeff from [facility]. Details from what Dr. Culver stated below were left on the voicemail.    CLIFFORD Gomez       For the dosing, it s 50mg twice a week for 3 months then once a week for maintenance.     For alternatives, I m not sure what alternative they d like because he s failed topicals, unable to do lights due to school, unable to do methotrexate due to liver enzymes, and failed Adalimumab (which is humira). Could we call and find out please what alternative they d like?     Thank you   Jacky

## 2021-08-24 NOTE — TELEPHONE ENCOUNTER
[facility]    Phone Message    May a detailed message be left on voicemail: yes     Reason for Call: Medication Question or concern regarding medication   Prescription Clarification  Name of Medication: Enbrel  Prescribing Provider: Dr. Davis   Pharmacy:    What on the order needs clarification? Pharm at [facility] called and has a question regarding dosing and possible alternatives. Please call him back to discuss. Thanks          Action Taken: Message routed to:  [facility]    Travel Screening: Not Applicable

## 2021-08-25 NOTE — PROGRESS NOTES
[facility] Dermatology Note   Encounter Date: Aug 26, 2021  Office visit    Dermatology Problem List:    Lenard/Palomo CC     # Psoriasis (~60% BSA initially; now <5%)  - Punch bx on scalp noted the possibility of sebopsoriasis  - s/p ILK-10 to arms 6/17/21 (2cc)  - Current Tx: enbrel and clobetasol              - Last QuantGold (10/22/20): negative  - Prior Tx: triamcinolone 0.1% ointment, humira (ineffective)  # Non-scarring alopecia (alopecia areata vs psoriatic alopecia or combination)  - s/p punch bx 12/30/19 to scalp  - s/p ILK-10 scalp 10/22/20 (2cc), 6/17/21 (2cc)  - Current Tx: enbrel (for psoriasis) and clobetasol ointment  - Prior Tx: humira (ineffective), Doxycycline (stopped 2/2 GI upset), hibiclens 3x/week, triamcinolone 0.1% ointment, [place] [facility] [place] well as allergic contact dermatitis sensitization, ferrous sulfate 325mg daily, Vitamin C 100mg daily   # Vitamin D insufficiency  - Last level (10/2020): 12  - s/p Vit D 50k units x6wks (10/2020)  # Bacterial superinfection on scalp  - Current Tx: BPO wash  - s/p Keflex 500mg BID     ___________________________________________    Assessment & Plan:    # Psoriasis  # Alopecia areata  Condition has significantly improved since last office visit. Will continue with current regimen. Refilled today  - C/w enbrel 50mg subcutaneous BIW   - Instructed to transition to once weekly after 3 month  - C/w clobetasol as needed  - Photodocumentation obtained in clinic     Procedures Performed:  Photographic documentation     Follow-up: 3m    Staff Involved:  Patient was seen and staffed with attending physician Dr. Ryan Culver MD  Med/Derm Resident PGY-4  P:[phone]    Staff Addendum:  Patient was seen and examined with the medicine/dermatology resident. I agree with the history, review of systems, physical examination, assessments and plan.    Dorothy Davis MD  Professor and  Chair  Department of  Reason For Visit  VELMA HANSON is here today for a nurse visit for medication administration . Patient given Vit B12 injection Lt arm subq, tolerated procedure well. Patient c/o Lt heel pain , states stepped on a nail awhile back, now has a hole there which has a hard substance discharge every so often. Causing alot of pain on and off.   encouraged to make an appointment with Dr.D Mcdaniel very soon before next scheduled appt.      Current Meds   1. Flovent Diskus 50 MCG/BLIST Inhalation Aerosol Powder Breath Activated; USE ONE   INHALATION TWICE A DAY;   Therapy: 29Oct2015 to (Last Rx:[id number])  Requested for: 01Ayn1140 Ordered   2. Lansoprazole 30 MG Oral Capsule Delayed Release; TAKE 1 CAPSULE DAILY;   Therapy: 28Cnu7480 to (Evaluate:01Fni1908)  Requested for: 25Uas1459; Last   Rx:[id number] Ordered   3. Meclizine HCl - 12.5 MG Oral Tablet; TAKE 1 TABLET BY MOUTH THREE TIMES DAILY AS   NEEDED;   Therapy: 01Snr7640 to (Evaluate:96Wov1498)  Requested for: 43Wze9498; Last   Rx:[id number] Ordered   4. Ondansetron 8 MG Oral Tablet Disintegrating; DISSOLVE 1 TABLET ON THE TONGUE   THREE TIMES DAILY AS NEEDED FOR NAUSEA OR VOMITING;   Therapy: 46Aqt2075 to (Evaluate:90Omz5739)  Requested for: 91Zqu1169; Last   Rx:[id number] Ordered   5. Venlafaxine HCl  MG Oral Capsule Extended Release 24 Hour; TAKE 1 CAPSULE   BY MOUTH EVERY DAY;   Therapy: 49Nho7017 to (Evaluate:53Owm5528)  Requested for: 04Gsz3392; Last   Rx:[id number] Ordered   6. Venlafaxine HCl ER 37.5 MG Oral Capsule Extended Release 24 Hour; TAKE 1   CAPSULE BY MOUTH EVERY DAY WITH FOOD IN ADDITION TO 150MG CAPSULE;   Therapy: 44Vvj3570 to (Evaluate:15Igw1903)  Requested for: 72Jbg2933; Last   Rx:[id number] Ordered   7. Ventolin  (90 Base) MCG/ACT Inhalation Aerosol Solution;   Therapy: 16Zch0305 to Recorded    Allergies  No Known Drug Allergies    Plan   1. Cyanocobalamin 1000 MCG/ML Injection Solution    Signatures   Electronically signed by : Roxanne  Dermatology  [facility]  ___________________________________________      CC: Derm Problem (sulaiman is coming in today for a follow up on the psoriasis, states that things have been getting better)      HPI:  Mr. Sulaiman Mccray is a(n) 21 year old male who presents to clinic today for psoriasis and alopecia areata f/u  - reports that his condition has significantly improved on etanercept  - notes some hair regrowth beginning  - notes that a majority of his plaques have thinned out  - very happy with his regimen thus far  - mom is also happy  - otherwise feeling well in usual state of health    Physical exam:  General: in no acute distress, well-developed, well-nourished  Skin:  - skin type: light brown  - previous psoriasis plaques have thinned  - hair ostia appear to have some pigment (possible regrowth?)  - see additional note for more photos  - No other lesions of concern on areas examined.     Medications:  Current Outpatient Medications   Medication     benzoyl peroxide (PANOXYL) 10 % external liquid     chlorhexidine (HIBICLENS) 4 % liquid     cholecalciferol (VITAMIN D-1000 MAX ST) 1000 units TABS     clobetasol (TEMOVATE) 0.05 % external ointment     etanercept (ENBREL SURECLICK) 50 MG/ML autoinjector     etanercept (ENBREL SURECLICK) 50 MG/ML autoinjector     fexofenadine (ALLEGRA) 180 MG tablet     triamcinolone (KENALOG) 0.1 % external ointment     Current Facility-Administered Medications   Medication     triamcinolone acetonide (KENALOG-10) injection 20 mg     triamcinolone acetonide (KENALOG-10) injection 20 mg     triamcinolone acetonide (KENALOG-10) injection 30 mg     triamcinolone acetonide (KENALOG-10) injection 40 mg      Past Medical History:   Patient Active Problem List   Diagnosis     ADHD (attention deficit hyperactivity disorder)     Alopecia areata     Anxiety     Major depressive disorder, single episode, mild (H)     Suicidal ideation     No past medical history on  RAYMUNDO Mittal; Nov 16 2017 11:34AM CST     file.

## 2021-08-26 ENCOUNTER — OFFICE VISIT (OUTPATIENT)
Dept: DERMATOLOGY | Facility: CLINIC | Age: 21
End: 2021-08-26
Payer: COMMERCIAL

## 2021-08-26 ENCOUNTER — TELEPHONE (OUTPATIENT)
Dept: DERMATOLOGY | Facility: CLINIC | Age: 21
End: 2021-08-26

## 2021-08-26 DIAGNOSIS — L40.9 PSORIASIS: Primary | ICD-10-CM

## 2021-08-26 DIAGNOSIS — L63.9 ALOPECIA AREATA: ICD-10-CM

## 2021-08-26 PROCEDURE — 99214 OFFICE O/P EST MOD 30 MIN: CPT | Mod: GC | Performed by: STUDENT IN AN ORGANIZED HEALTH CARE EDUCATION/TRAINING PROGRAM

## 2021-08-26 ASSESSMENT — PAIN SCALES - GENERAL: PAINLEVEL: NO PAIN (0)

## 2021-08-26 NOTE — NURSING NOTE
Dermatology Rooming Note    Sulaiman Mccray's goals for this visit include:   Chief Complaint   Patient presents with     Derm Problem     sulaiman is coming in today for a follow up on the psoriasis, states that things have been getting better     Silke Dubon CMA on 8/26/2021 at 9:05 AM

## 2021-08-26 NOTE — TELEPHONE ENCOUNTER
Prior Authorization Approval    Authorization Effective Date: 7/27/2021  Authorization Expiration Date: 8/25/2024  Medication: Enbrel- Renewal-Approved  Approved Dose/Quantity:50mg  Reference #: Case- [id number]   Insurance Company: [employer] - Phone [phone] Fax [phone]  Expected CoPay: $0     CoPay Card Available: No    Foundation Assistance Needed: N/A  Which Pharmacy is filling the prescription (Not needed for infusion/clinic administered): [facility] - [place], [place] - [address]  Pharmacy Notified: Yes  Patient Notified: No

## 2021-08-26 NOTE — LETTER
8/26/2021       RE: Sulaiman Mccray  [address]  [place] [address]     Dear Colleague,    Thank you for referring your patient, Sulaiman Mccray, to the [facility] [place] at [facility]. Please see a copy of my visit note below.    [facility] Dermatology Note   Encounter Date: Aug 26, 2021  Office visit    Dermatology Problem List:    Lenard/Palomo CC     # Psoriasis (~60% BSA initially; now <5%)  - Punch bx on scalp noted the possibility of sebopsoriasis  - s/p ILK-10 to arms 6/17/21 (2cc)  - Current Tx: enbrel and clobetasol              - Last QuantGold (10/22/20): negative  - Prior Tx: triamcinolone 0.1% ointment, humira (ineffective)  # Non-scarring alopecia (alopecia areata vs psoriatic alopecia or combination)  - s/p punch bx 12/30/19 to scalp  - s/p ILK-10 scalp 10/22/20 (2cc), 6/17/21 (2cc)  - Current Tx: enbrel (for psoriasis) and clobetasol ointment  - Prior Tx: humira (ineffective), Doxycycline (stopped 2/2 GI upset), hibiclens 3x/week, triamcinolone 0.1% ointment, ILK qmonth x 8-9 months at [facility] as well as allergic contact dermatitis sensitization, ferrous sulfate 325mg daily, Vitamin C 100mg daily   # Vitamin D insufficiency  - Last level (10/2020): 12  - s/p Vit D 50k units x6wks (10/2020)  # Bacterial superinfection on scalp  - Current Tx: BPO wash  - s/p Keflex 500mg BID     ___________________________________________    Assessment & Plan:    # Psoriasis  # Alopecia areata  Condition has significantly improved since last office visit. Will continue with current regimen. Refilled today  - C/w enbrel 50mg subcutaneous BIW   - Instructed to transition to once weekly after 3 month  - C/w clobetasol as needed  - Photodocumentation obtained in clinic     Procedures Performed:  Photographic documentation     Follow-up: 3m    Staff Involved:  Patient was seen and staffed with attending  physician Dr. Ryan Culver MD  Med/Derm Resident PGY-4  P:[phone]    Staff Addendum:  Patient was seen and examined with the medicine/dermatology resident. I agree with the history, review of systems, physical examination, assessments and plan.    Dorothy Davis MD  Professor and  Chair  Department of Dermatology  [facility]  ___________________________________________      CC: Derm Problem (vilma is coming in today for a follow up on the psoriasis, states that things have been getting better)      HPI:  Mr. Vilma Mccray is a(n) 21 year old male who presents to clinic today for psoriasis and alopecia areata f/u  - reports that his condition has significantly improved on etanercept  - notes some hair regrowth beginning  - notes that a majority of his plaques have thinned out  - very happy with his regimen thus far  - mom is also happy  - otherwise feeling well in usual state of health    Physical exam:  General: in no acute distress, well-developed, well-nourished  Skin:  - skin type: light brown  - previous psoriasis plaques have thinned  - hair ostia appear to have some pigment (possible regrowth?)  - see additional note for more photos  - No other lesions of concern on areas examined.     Medications:  Current Outpatient Medications   Medication     benzoyl peroxide (PANOXYL) 10 % external liquid     chlorhexidine (HIBICLENS) 4 % liquid     cholecalciferol (VITAMIN D-1000 MAX ST) 1000 units TABS     clobetasol (TEMOVATE) 0.05 % external ointment     etanercept (ENBREL SURECLICK) 50 MG/ML autoinjector     etanercept (ENBREL SURECLICK) 50 MG/ML autoinjector     fexofenadine (ALLEGRA) 180 MG tablet     triamcinolone (KENALOG) 0.1 % external ointment     Current Facility-Administered Medications   Medication     triamcinolone acetonide (KENALOG-10) injection 20 mg     triamcinolone acetonide (KENALOG-10) injection 20 mg     triamcinolone acetonide (KENALOG-10) injection  30 mg     triamcinolone acetonide (KENALOG-10) injection 40 mg      Past Medical History:   Patient Active Problem List   Diagnosis     ADHD (attention deficit hyperactivity disorder)     Alopecia areata     Anxiety     Major depressive disorder, single episode, mild (H)     Suicidal ideation     No past medical history on file.

## 2021-10-10 ENCOUNTER — HEALTH MAINTENANCE LETTER (OUTPATIENT)
Age: 21
End: 2021-10-10

## 2021-11-18 ENCOUNTER — OFFICE VISIT (OUTPATIENT)
Dept: DERMATOLOGY | Facility: CLINIC | Age: 21
End: 2021-11-18
Payer: COMMERCIAL

## 2021-11-18 DIAGNOSIS — Z51.81 MEDICATION MONITORING ENCOUNTER: ICD-10-CM

## 2021-11-18 DIAGNOSIS — L63.9 ALOPECIA AREATA: ICD-10-CM

## 2021-11-18 DIAGNOSIS — L40.9 PSORIASIS: Primary | ICD-10-CM

## 2021-11-18 PROCEDURE — 99214 OFFICE O/P EST MOD 30 MIN: CPT | Mod: 25 | Performed by: STUDENT IN AN ORGANIZED HEALTH CARE EDUCATION/TRAINING PROGRAM

## 2021-11-18 PROCEDURE — 11901 INJECT SKIN LESIONS >7: CPT | Performed by: STUDENT IN AN ORGANIZED HEALTH CARE EDUCATION/TRAINING PROGRAM

## 2021-11-18 ASSESSMENT — PAIN SCALES - GENERAL: PAINLEVEL: NO PAIN (0)

## 2021-11-18 NOTE — NURSING NOTE
Drug Administration Record    Prior to injection, verified patient identity using patient's name and date of birth.  Due to injection administration, patient instructed to remain in clinic for 15 minutes  afterwards, and to report any adverse reaction to me immediately.    Drug Name: triamcinolone acetonide(kenalog)  Dose: 6mL of triamcinolone 10mg/mL, 60mg dose, 1mL of triamcinolone 5mg/mL, 5mg dose  Route administered: ID  NDC #: Kenalog-10 (7286-4618-06)  Amount of waste(mL):3.5  Reason for waste: Multi dose vial    LOT #: [id number]  SITE: Scalp  : KIT digital  EXPIRATION DATE: 03/2023

## 2021-11-18 NOTE — PATIENT INSTRUCTIONS
Scalp looks good today.  Let see how you respond to the Kenalog injections.  We could consider transitioning you to a psoriasis medication which has been shown to be effective for alopecia areata.

## 2021-11-18 NOTE — LETTER
11/18/2021       RE: Sulaiman Mccray  [address]  [facility] [address]     Dear Colleague,    Thank you for referring your patient, Sulaiman Mccray, to the [facility] DERMATOLOGY CLINIC [place] at [facility]. Please see a copy of my visit note below.    [facility] Dermatology Note   Encounter Date: Nov 18, 2021  Office visit    Dermatology Problem List:    Lenard/Palomo CC     # Psoriasis (~60% BSA initially; now <5%)  - Punch bx on scalp noted the possibility of sebopsoriasis  - s/p ILK-10 to arms 6/17/21 (2cc)  - Current Tx: enbrel and clobetasol              - Last QuantGold (10/22/20): negative  - Prior Tx: triamcinolone 0.1% ointment, humira (ineffective)  # Non-scarring alopecia (alopecia areata vs psoriatic alopecia or combination)  - s/p punch bx 12/30/19 to scalp  - s/p ILK-10 scalp 10/22/20 (2cc), 6/17/21 (2cc), 11/18/21 (6cc ILK10, 1cc ILK5)  - Current Tx: enbrel (for psoriasis) and clobetasol ointment  - Prior Tx: humira (ineffective), Doxycycline (stopped 2/2 GI upset), hibiclens 3x/week, triamcinolone 0.1% ointment, ILK qmonth x 8-9 months at [facility] as well as allergic contact dermatitis sensitization, ferrous sulfate 325mg daily, Vitamin C 100mg daily   # Vitamin D insufficiency  - Last level (10/2020): 12  - s/p Vit D 50k units x6wks (10/2020)  # Bacterial superinfection on scalp  - Current Tx: BPO wash  - s/p Keflex 500mg BID     ___________________________________________    Assessment & Plan:    # Alopecia areata  # Psoriasis  Excellent sustained response to Enbrel.  Tolerating without difficulty.  No tuberculosis risk factors identified.  Reviewed recent outside CBC CMP.  Mild LFT derangement which has been previously documented and difficult to interpret in the setting of mild gastrointestinal disease at the time.  We will hold on repeat monitoring labs at present.  Interestingly, with worsening of  likely alopecia areata in the setting of improved psoriatic control (had hoped we see some regrowth with control of ?psoriatic alopecia).  Did discuss possible transition to tofacitinib, but will trial ILK prior.  - C/w enbrel 50mg subcutaneous weekly  - C/w clobetasol as needed  - ILK performed today  -Could consider Xeljanz in future     Procedures Performed:  Kenalog intralesional injection procedure note: After verbal consent and discussion of risks including but not limited to atrophy, pain, and bruising, time out was performed, the patient underwent positioning, 6 total cc of Kenalog 10 mg/cc was injected throughout scalp at 40+ injection points.  An additional 1 cc of Kenalog 5 mg/ml was injected into the bilateral brows with remainder in the occipital scalp.  The patient tolerated the procedure well and left the Dermatology clinic in good condition.      Follow-up: 6w    Staff Involved:  Patient was seen and staffed with attending physician Dr. Mayur Weinberg MD  Internal Medicine - Dermatology PGY 4      I have personally examined this patient and agree with 's documentation and plan of care. I have reviewed and amended the resident's note above. The documentation accurately reflects my clinical observations, diagnoses, treatment and follow-up plans. I was present for key portions of the procedure.       Jena Merchant MD  Dermatology Staff  ___________________________________________      CC: Psoriasis (Sulaiman is here today in order to follow up on psoriasis. He states that it is doing well. )      HPI:  Mr. Sulaiman Mccray is a(n) 21 year old male who presents to clinic today for psoriasis and alopecia areata f/u.  Psoriasiform plaques have nearly resolved on Enbrel.  He is tolerating this well without difficulty.  He denies any travel to endemic areas, healthcare work, tuberculosis exposures.  Family is overall very pleased with degree of improvement; however, they were hoping  that patient would get more hair back.  Patient is amenable to injections today.    Physical exam:  General: in no acute distress, well-developed, well-nourished  Skin:  - skin type: light brown  - previous psoriasis plaques have resolved  - hair ostia preserved; scattered terminal hair growth though very patchy and overall decreased from prior  - No other lesions of concern on areas examined.     Medications:  Current Outpatient Medications   Medication     benzoyl peroxide (PANOXYL) 10 % external liquid     chlorhexidine (HIBICLENS) 4 % liquid     cholecalciferol (VITAMIN D-1000 MAX ST) 1000 units TABS     clobetasol (TEMOVATE) 0.05 % external ointment     etanercept (ENBREL SURECLICK) 50 MG/ML autoinjector     etanercept (ENBREL SURECLICK) 50 MG/ML autoinjector     fexofenadine (ALLEGRA) 180 MG tablet     triamcinolone (KENALOG) 0.1 % external ointment     Current Facility-Administered Medications   Medication     triamcinolone acetonide (KENALOG-10) injection 20 mg     triamcinolone acetonide (KENALOG-10) injection 20 mg     triamcinolone acetonide (KENALOG-10) injection 30 mg     triamcinolone acetonide (KENALOG-10) injection 40 mg      Past Medical History:   Patient Active Problem List   Diagnosis     ADHD (attention deficit hyperactivity disorder)     Alopecia areata     Anxiety     Major depressive disorder, single episode, mild (H)     Suicidal ideation     History reviewed. No pertinent past medical history.

## 2021-11-18 NOTE — NURSING NOTE
Dermatology Rooming Note    Sulaiman Mccray's goals for this visit include:   Chief Complaint   Patient presents with     Psoriasis     Sulaiman is here today in order to follow up on psoriasis. He states that it is doing well.      Filipe Mota, EMT

## 2021-11-18 NOTE — PROGRESS NOTES
[facility] Dermatology Note   Encounter Date: Nov 18, 2021  Office visit    Dermatology Problem List:    Lenard/Palomo CC     # Psoriasis (~60% BSA initially; now <5%)  - Punch bx on scalp noted the possibility of sebopsoriasis  - s/p ILK-10 to arms 6/17/21 (2cc)  - Current Tx: enbrel and clobetasol              - Last QuantGold (10/22/20): negative  - Prior Tx: triamcinolone 0.1% ointment, humira (ineffective)  # Non-scarring alopecia (alopecia areata vs psoriatic alopecia or combination)  - s/p punch bx 12/30/19 to scalp  - s/p ILK-10 scalp 10/22/20 (2cc), 6/17/21 (2cc), 11/18/21 (6cc ILK10, 1cc ILK5)  - Current Tx: enbrel (for psoriasis) and clobetasol ointment  - Prior Tx: humira (ineffective), Doxycycline (stopped 2/2 GI upset), hibiclens 3x/week, triamcinolone 0.1% ointment, ILK qmonth x 8-9 months at [facility] [place] as well as allergic contact dermatitis sensitization, ferrous sulfate 325mg daily, Vitamin C 100mg daily   # Vitamin D insufficiency  - Last level (10/2020): 12  - s/p Vit D 50k units x6wks (10/2020)  # Bacterial superinfection on scalp  - Current Tx: BPO wash  - s/p Keflex 500mg BID     ___________________________________________    Assessment & Plan:    # Alopecia areata  # Psoriasis  Excellent sustained response to Enbrel.  Tolerating without difficulty.  No tuberculosis risk factors identified.  Reviewed recent outside CBC CMP.  Mild LFT derangement which has been previously documented and difficult to interpret in the setting of mild gastrointestinal disease at the time.  We will hold on repeat monitoring labs at present.  Interestingly, with worsening of likely alopecia areata in the setting of improved psoriatic control (had hoped we see some regrowth with control of ?psoriatic alopecia).  Did discuss possible transition to tofacitinib, but will trial ILK prior.  - C/w enbrel 50mg subcutaneous weekly  - C/w clobetasol as needed  - ILK performed today  -Could consider Xeljanz in  future     Procedures Performed:  Kenalog intralesional injection procedure note: After verbal consent and discussion of risks including but not limited to atrophy, pain, and bruising, time out was performed, the patient underwent positioning, 6 total cc of Kenalog 10 mg/cc was injected throughout scalp at 40+ injection points.  An additional 1 cc of Kenalog 5 mg/ml was injected into the bilateral brows with remainder in the occipital scalp.  The patient tolerated the procedure well and left the Dermatology clinic in good condition.      Follow-up: 6w    Staff Involved:  Patient was seen and staffed with attending physician Dr. Mayur Weinberg MD  Internal Medicine - Dermatology PGY 4      I have personally examined this patient and agree with 's documentation and plan of care. I have reviewed and amended the resident's note above. The documentation accurately reflects my clinical observations, diagnoses, treatment and follow-up plans. I was present for key portions of the procedure.       Jena Merchant MD  Dermatology Staff  ___________________________________________      CC: Psoriasis (Sulaimna is here today in order to follow up on psoriasis. He states that it is doing well. )      HPI:  Mr. Sulaiman Mccray is a(n) 21 year old male who presents to clinic today for psoriasis and alopecia areata f/u.  Psoriasiform plaques have nearly resolved on Enbrel.  He is tolerating this well without difficulty.  He denies any travel to endemic areas, healthcare work, tuberculosis exposures.  Family is overall very pleased with degree of improvement; however, they were hoping that patient would get more hair back.  Patient is amenable to injections today.    Physical exam:  General: in no acute distress, well-developed, well-nourished  Skin:  - skin type: light brown  - previous psoriasis plaques have resolved  - hair ostia preserved; scattered terminal hair growth though very patchy and overall  decreased from prior  - No other lesions of concern on areas examined.     Medications:  Current Outpatient Medications   Medication     benzoyl peroxide (PANOXYL) 10 % external liquid     chlorhexidine (HIBICLENS) 4 % liquid     cholecalciferol (VITAMIN D-1000 MAX ST) 1000 units TABS     clobetasol (TEMOVATE) 0.05 % external ointment     etanercept (ENBREL SURECLICK) 50 MG/ML autoinjector     etanercept (ENBREL SURECLICK) 50 MG/ML autoinjector     fexofenadine (ALLEGRA) 180 MG tablet     triamcinolone (KENALOG) 0.1 % external ointment     Current Facility-Administered Medications   Medication     triamcinolone acetonide (KENALOG-10) injection 20 mg     triamcinolone acetonide (KENALOG-10) injection 20 mg     triamcinolone acetonide (KENALOG-10) injection 30 mg     triamcinolone acetonide (KENALOG-10) injection 40 mg      Past Medical History:   Patient Active Problem List   Diagnosis     ADHD (attention deficit hyperactivity disorder)     Alopecia areata     Anxiety     Major depressive disorder, single episode, mild (H)     Suicidal ideation     History reviewed. No pertinent past medical history.

## 2022-01-27 ENCOUNTER — OFFICE VISIT (OUTPATIENT)
Dept: DERMATOLOGY | Facility: CLINIC | Age: 22
End: 2022-01-27
Payer: COMMERCIAL

## 2022-01-27 ENCOUNTER — LAB (OUTPATIENT)
Dept: LAB | Facility: CLINIC | Age: 22
End: 2022-01-27
Payer: COMMERCIAL

## 2022-01-27 DIAGNOSIS — L40.9 PSORIASIS: Primary | ICD-10-CM

## 2022-01-27 DIAGNOSIS — L63.9 ALOPECIA AREATA: ICD-10-CM

## 2022-01-27 DIAGNOSIS — Z51.81 MEDICATION MONITORING ENCOUNTER: ICD-10-CM

## 2022-01-27 PROCEDURE — 11901 INJECT SKIN LESIONS >7: CPT | Mod: GC | Performed by: STUDENT IN AN ORGANIZED HEALTH CARE EDUCATION/TRAINING PROGRAM

## 2022-01-27 PROCEDURE — 86481 TB AG RESPONSE T-CELL SUSP: CPT | Mod: 90 | Performed by: PATHOLOGY

## 2022-01-27 PROCEDURE — 36415 COLL VENOUS BLD VENIPUNCTURE: CPT | Performed by: PATHOLOGY

## 2022-01-27 PROCEDURE — 99213 OFFICE O/P EST LOW 20 MIN: CPT | Mod: 25 | Performed by: STUDENT IN AN ORGANIZED HEALTH CARE EDUCATION/TRAINING PROGRAM

## 2022-01-27 ASSESSMENT — PAIN SCALES - GENERAL: PAINLEVEL: NO PAIN (0)

## 2022-01-27 NOTE — PROGRESS NOTES
[facility] Dermatology Note   Encounter Date: Jan 27, 2022  Office visit    Dermatology Problem List:    Lenard/Palomo CC    # Psoriasis (~60% BSA initially; now <5%)  - Punch bx on scalp noted the possibility of sebopsoriasis  - s/p ILK-10 to arms 6/17/21 (2cc)  - Current Tx: enbrel 50mg qweek              - Last QuantGold (1/2022): pending  - Adjunct Tx: clobetasol  - Prior Tx: triamcinolone 0.1% ointment, humira (ineffective)  # Non-scarring alopecia (alopecia areata vs psoriatic alopecia or combination)  - s/p punch bx 12/30/19  - s/p ILK-10 scalp 10/22/20 (2cc), 6/17/21 (2cc), 11/18/21 (6cc ILK10, 1cc ILK5), 1/27/22 (3cc ILK10)  - Current Tx: enbrel (for psoriasis) and clobetasol ointment  - Prior Tx: humira (ineffective), Doxycycline (stopped 2/2 GI upset), hibiclens 3x/week, triamcinolone 0.1% ointment, [place] [facility] [place] well as allergic contact dermatitis sensitization, ferrous sulfate 325mg daily, Vitamin C 100mg daily   # Vitamin D insufficiency  - Last level (10/2020): 12  - s/p Vit D 50k units x6wks (10/2020)  # Bacterial superinfection on scalp  - Current Tx: BPO wash  - s/p Keflex 500mg BID    ___________________________________________    Assessment & Plan:    # Alopecia areata, improving  Condition has improved slightly with recent ILK. Agreeable to repeating today in clinic. Will also encourage clobetasol once a week to eyebrows for regrowth  - See ILK procedure note below  - Continue with clobetasol once a week    # Psoriasis  Condition continue to be well controlled on current regimen. Will continue with current medication. Will obtain QuantGold for safety monitoring  - Continue with enbrel 50mg weekly   - Refilled today  - Ordered QuantGold  - Continue with topical steroids as needed    Procedures Performed:  - Kenalog intralesional injection procedure note: after verbal consent and discussion of risks including but not limited to atrophy, pain, and bruising,  time out was performed, patient positioned, area cleaned with alcohol, 3 ml Kenalog 10 mg/ml injected into >7 sites on scalp; patient tolerated procedure well    Follow-up: 8 weeks    Staff Involved:  Patient was seen and staffed with attending physician Dr. Everett Culver MD  Med/Derm Resident PGY-4  P:[phone]    Staff Addendum:  I, Kristal Floyd MD, saw this patient with the resident and agree with the resident s findings and plan of care as documented in the resident s note.  Injections reviewed with resident.     ___________________________________________      CC: Hair Loss (Sulaiamn is here today for a recheck and states there has been improvement )      HPI:  Mr. Sulaiman Mccray is a(n) 21 year old male who presents to clinic today for alopecia areata and psoriasis follow up  - For the psoriasis, condition is well controlled on enbrel. Both mom and patient are happy with therapy thus far. Will need a refill  - For the alopecia areata, has noted some hair regrowth over larger area of scalp  - Has also noted some increased pigment to the follicular ostia  - otherwise feeling well in usual state of health    Physical exam:  General: in no acute distress, well-developed, well-nourished  Skin:  - skin type: light brown  - hyperpigmented patches on previous psoriatic plaques  - hair regrowth noted on multiple areas of scalp  - follicular ostia with more pigment   - No other lesions of concern on areas examined.                     Medications:  Current Outpatient Medications   Medication     benzoyl peroxide (PANOXYL) 10 % external liquid     chlorhexidine (HIBICLENS) 4 % liquid     cholecalciferol (VITAMIN D-1000 MAX ST) 1000 units TABS     clobetasol (TEMOVATE) 0.05 % external ointment     etanercept (ENBREL SURECLICK) 50 MG/ML autoinjector     etanercept (ENBREL SURECLICK) 50 MG/ML autoinjector     fexofenadine (ALLEGRA) 180 MG tablet     triamcinolone (KENALOG) 0.1 % external  ointment     Current Facility-Administered Medications   Medication     triamcinolone acetonide (KENALOG-10) injection 10 mg     triamcinolone acetonide (KENALOG-10) injection 20 mg     triamcinolone acetonide (KENALOG-10) injection 20 mg     triamcinolone acetonide (KENALOG-10) injection 30 mg     triamcinolone acetonide (KENALOG-10) injection 40 mg      Past Medical History:   Patient Active Problem List   Diagnosis     ADHD (attention deficit hyperactivity disorder)     Alopecia areata     Anxiety     Major depressive disorder, single episode, mild (H)     Suicidal ideation     No past medical history on file.

## 2022-01-27 NOTE — LETTER
1/27/2022       RE: Sulaiman Mccray  [address]  [facility] [address]     Dear Colleague,    Thank you for referring your patient, Sulaiman Mccray, to the [facility] DERMATOLOGY CLINIC [place] at [facility]. Please see a copy of my visit note below.    [facility] Dermatology Note   Encounter Date: Jan 27, 2022  Office visit    Dermatology Problem List:    Lenard/Palomo CC    # Psoriasis (~60% BSA initially; now <5%)  - Punch bx on scalp noted the possibility of sebopsoriasis  - s/p ILK-10 to arms 6/17/21 (2cc)  - Current Tx: enbrel 50mg qweek              - Last QuantGold (1/2022): pending  - Adjunct Tx: clobetasol  - Prior Tx: triamcinolone 0.1% ointment, humira (ineffective)  # Non-scarring alopecia (alopecia areata vs psoriatic alopecia or combination)  - s/p punch bx 12/30/19  - s/p ILK-10 scalp 10/22/20 (2cc), 6/17/21 (2cc), 11/18/21 (6cc ILK10, 1cc ILK5), 1/27/22 (3cc ILK10)  - Current Tx: enbrel (for psoriasis) and clobetasol ointment  - Prior Tx: humira (ineffective), Doxycycline (stopped 2/2 GI upset), hibiclens 3x/week, triamcinolone 0.1% ointment, ILK qmonth x 8-9 months at [facility] as well as allergic contact dermatitis sensitization, ferrous sulfate 325mg daily, Vitamin C 100mg daily   # Vitamin D insufficiency  - Last level (10/2020): 12  - s/p Vit D 50k units x6wks (10/2020)  # Bacterial superinfection on scalp  - Current Tx: BPO wash  - s/p Keflex 500mg BID    ___________________________________________    Assessment & Plan:    # Alopecia areata, improving  Condition has improved slightly with recent ILK. Agreeable to repeating today in clinic. Will also encourage clobetasol once a week to eyebrows for regrowth  - See ILK procedure note below  - Continue with clobetasol once a week    # Psoriasis  Condition continue to be well controlled on current regimen. Will continue with current medication. Will obtain  QuantGold for safety monitoring  - Continue with enbrel 50mg weekly   - Refilled today  - Ordered QuantGold  - Continue with topical steroids as needed    Procedures Performed:  - Kenalog intralesional injection procedure note: after verbal consent and discussion of risks including but not limited to atrophy, pain, and bruising, time out was performed, patient positioned, area cleaned with alcohol, 3 ml Kenalog 10 mg/ml injected into >7 sites on scalp; patient tolerated procedure well    Follow-up: 8 weeks    Staff Involved:  Patient was seen and staffed with attending physician Dr. Everett Culver MD  Med/Derm Resident PGY-4  P:[phone]    Staff Addendum:  I, Kristal Floyd MD, saw this patient with the resident and agree with the resident s findings and plan of care as documented in the resident s note.  Injections reviewed with resident.     ___________________________________________      CC: Hair Loss (Sulaiman is here today for a recheck and states there has been improvement )      HPI:  Mr. Sulaiman Mccray is a(n) 21 year old male who presents to clinic today for alopecia areata and psoriasis follow up  - For the psoriasis, condition is well controlled on enbrel. Both mom and patient are happy with therapy thus far. Will need a refill  - For the alopecia areata, has noted some hair regrowth over larger area of scalp  - Has also noted some increased pigment to the follicular ostia  - otherwise feeling well in usual state of health    Physical exam:  General: in no acute distress, well-developed, well-nourished  Skin:  - skin type: light brown  - hyperpigmented patches on previous psoriatic plaques  - hair regrowth noted on multiple areas of scalp  - follicular ostia with more pigment   - No other lesions of concern on areas examined.                     Medications:  Current Outpatient Medications   Medication     benzoyl peroxide (PANOXYL) 10 % external liquid     chlorhexidine  (HIBICLENS) 4 % liquid     cholecalciferol (VITAMIN D-1000 MAX ST) 1000 units TABS     clobetasol (TEMOVATE) 0.05 % external ointment     etanercept (ENBREL SURECLICK) 50 MG/ML autoinjector     etanercept (ENBREL SURECLICK) 50 MG/ML autoinjector     fexofenadine (ALLEGRA) 180 MG tablet     triamcinolone (KENALOG) 0.1 % external ointment     Current Facility-Administered Medications   Medication     triamcinolone acetonide (KENALOG-10) injection 10 mg     triamcinolone acetonide (KENALOG-10) injection 20 mg     triamcinolone acetonide (KENALOG-10) injection 20 mg     triamcinolone acetonide (KENALOG-10) injection 30 mg     triamcinolone acetonide (KENALOG-10) injection 40 mg      Past Medical History:   Patient Active Problem List   Diagnosis     ADHD (attention deficit hyperactivity disorder)     Alopecia areata     Anxiety     Major depressive disorder, single episode, mild (H)     Suicidal ideation     No past medical history on file.          Again, thank you for allowing me to participate in the care of your patient.      Sincerely,    Marcin Culver MD

## 2022-01-27 NOTE — NURSING NOTE
Dermatology Rooming Note    Sulaiman Mccray's goals for this visit include:   Chief Complaint   Patient presents with     Hair Loss     Sulaiman is here today for a recheck and states there has been improvement      Carlito Barker, CMA

## 2022-01-27 NOTE — LETTER
Date:January 27, 2022      Patient was self referred, no letter generated. Do not send.        [facility] Health Information

## 2022-01-28 LAB
GAMMA INTERFERON BACKGROUND BLD IA-ACNC: 0.01 IU/ML
M TB IFN-G BLD-IMP: NEGATIVE
M TB IFN-G CD4+ BCKGRND COR BLD-ACNC: 9.99 IU/ML
MITOGEN IGNF BCKGRD COR BLD-ACNC: 0.04 IU/ML
MITOGEN IGNF BCKGRD COR BLD-ACNC: 0.07 IU/ML
QUANTIFERON MITOGEN: 10 IU/ML
QUANTIFERON NIL TUBE: 0.01 IU/ML
QUANTIFERON TB1 TUBE: 0.05 IU/ML
QUANTIFERON TB2 TUBE: 0.08

## 2022-01-29 ENCOUNTER — HEALTH MAINTENANCE LETTER (OUTPATIENT)
Age: 22
End: 2022-01-29

## 2022-01-29 NOTE — RESULT ENCOUNTER NOTE
Reviewed results showing negative QuantGold. Discussed these results with patient over MyChart on 1/9/22. Ok to continue with ana rosa    CC'beka Floyd as FYI only

## 2022-02-03 ENCOUNTER — APPOINTMENT (OUTPATIENT)
Dept: INTERPRETER SERVICES | Facility: CLINIC | Age: 22
End: 2022-02-03
Payer: COMMERCIAL

## 2022-03-24 ENCOUNTER — OFFICE VISIT (OUTPATIENT)
Dept: DERMATOLOGY | Facility: CLINIC | Age: 22
End: 2022-03-24
Payer: COMMERCIAL

## 2022-03-24 DIAGNOSIS — L40.9 PSORIASIS: Primary | ICD-10-CM

## 2022-03-24 DIAGNOSIS — L63.9 ALOPECIA AREATA: ICD-10-CM

## 2022-03-24 PROCEDURE — 99214 OFFICE O/P EST MOD 30 MIN: CPT | Mod: GC | Performed by: STUDENT IN AN ORGANIZED HEALTH CARE EDUCATION/TRAINING PROGRAM

## 2022-03-24 ASSESSMENT — PAIN SCALES - GENERAL: PAINLEVEL: NO PAIN (0)

## 2022-03-24 NOTE — PROGRESS NOTES
[facility] Dermatology Note   Encounter Date: Mar 24, 2022  Office visit    Dermatology Problem List:    Lenard/Palomo CC     # Psoriasis (~60% BSA initially; now <5%)  - Punch bx on scalp noted the possibility of sebopsoriasis  - s/p ILK-10 to arms 6/17/21 (2cc)  - Current Tx: enbrel 50mg qweek              - Last QuantGold (1/2022): negative  - Adjunct Tx: clobetasol  - Prior Tx: triamcinolone 0.1% ointment, humira (ineffective)  # Alopecia areata  - s/p punch bx 12/30/19  - s/p ILK-10 scalp 10/22/20 (2cc), 6/17/21 (2cc), 11/18/21 (6cc ILK10, 1cc ILK5), 1/27/22 (3cc ILK10)  - Current Tx: enbrel (for psoriasis) and clobetasol ointment   - Planning to transition to either methotrexate or tofacitinib  - Prior Tx: humira (ineffective), Doxycycline (stopped 2/2 GI upset), hibiclens 3x/week, triamcinolone 0.1% ointment, [place]9 [place] [facility] [place] well as allergic contact dermatitis sensitization, ferrous sulfate 325mg daily, Vitamin C 100mg daily   # Vitamin D insufficiency  - Last level (10/2020): 12  - s/p Vit D 50k units x6wks (10/2020)  # Bacterial superinfection on scalp  - Current Tx: BPO wash  - s/p Keflex 500mg BID    ___________________________________________    Assessment & Plan:    # Psoriasis   # Alopecia areata  Psoriasis continues to respond to current regimen. However, alopecia areata has not responded to ILK. Discussed next step options in treatment including methotrexate vs tofacitinib. After discussion, patient would like to speak with family to determine best next course of action. Will continue with current regimen for now and patient will reach out about medication decision  - Continue with enbrel 50mg BIW  - Provided information about methotrexate and tofacitinib   - Will plan for 3m follow up in order to have started medication  - Photodocumentation obtained in clinic     Procedures Performed:  None    Follow-up: 3m    Staff Involved:  Patient was seen and staffed with  attending physician Dr. Everett Culver MD  Med/Derm Resident PGY-4  P:[phone]    Staff Addendum:  I, Kristal Floyd MD, saw this patient with the resident and agree with the resident s findings and plan of care as documented in the resident s note.  ___________________________________________      CC: Psoriasis (follow up with psoriasis ) and Derm Problem (follow up with alopecia )      HPI:  Mr. Sulaiman Mccray is a(n) 22 year old male who presents to clinic today for psoriasis and alopecia areata follow up. Reports:  - no change with ILK  - has noted persistent hair loss affecting eyebrows, eyelashes, and body  - no issues with psoriasis  - otherwise feeling well in usual state of health    Physical exam:  General: in no acute distress, well-developed, well-nourished  Skin:  - skin type: light brown  - unchanged alopecic patches on scalp  - continued improvement in psoriatic plaques  - No other lesions of concern on areas examined.             Medications:  Current Outpatient Medications   Medication     benzoyl peroxide (PANOXYL) 10 % external liquid     clobetasol (TEMOVATE) 0.05 % external ointment     etanercept (ENBREL SURECLICK) 50 MG/ML autoinjector     chlorhexidine (HIBICLENS) 4 % liquid     cholecalciferol (VITAMIN D-1000 MAX ST) 1000 units TABS     fexofenadine (ALLEGRA) 180 MG tablet     triamcinolone (KENALOG) 0.1 % external ointment     Current Facility-Administered Medications   Medication     triamcinolone acetonide (KENALOG-10) injection 10 mg     triamcinolone acetonide (KENALOG-10) injection 20 mg     triamcinolone acetonide (KENALOG-10) injection 20 mg     triamcinolone acetonide (KENALOG-10) injection 30 mg     triamcinolone acetonide (KENALOG-10) injection 40 mg     triamcinolone acetonide (KENALOG-10) injection 40 mg      Past Medical History:   Patient Active Problem List   Diagnosis     ADHD (attention deficit hyperactivity disorder)     Alopecia areata      Anxiety     Major depressive disorder, single episode, mild (H)     Suicidal ideation     No past medical history on file.    CC Referred Self  No address on file on close of this encounter.

## 2022-03-24 NOTE — LETTER
3/24/2022       RE: Sulaiman Mccray  [address]  [place] [facility] [place] [address]     Dear Colleague,    Thank you for referring your patient, Sulaiman Mccray, to the [facility] DERMATOLOGY CL[facility]IC [place] at [facility]. Please see a copy of my visit note below.    [facility] Dermatology Note   Encounter Date: Mar 24, 2022  Office visit    Dermatology Problem List:    Lenard/Palomo CC     # Psoriasis (~60% BSA initially; now <5%)  - Punch bx on scalp noted the possibility of sebopsoriasis  - s/p ILK-10 to arms 6/17/21 (2cc)  - Current Tx: enbrel 50mg qweek              - Last QuantGold (1/2022): negative  - Adjunct Tx: clobetasol  - Prior Tx: triamcinolone 0.1% ointment, humira (ineffective)  # Alopecia areata  - s/p punch bx 12/30/19  - s/p ILK-10 scalp 10/22/20 (2cc), 6/17/21 (2cc), 11/18/21 (6cc ILK10, 1cc ILK5), 1/27/22 (3cc ILK10)  - Current Tx: enbrel (for psoriasis) and clobetasol ointment   - Planning to transition to either methotrexate or tofacitinib  - Prior Tx: humira (ineffective), Doxycycline (stopped 2/2 GI upset), hibiclens 3x/week, triamcinolone 0.1% ointment, [place] [facility] [place] as allergic contact dermatitis sensitization, ferrous sulfate 325mg daily, Vitamin C 100mg daily   # Vitamin D insufficiency  - Last level (10/2020): 12  - s/p Vit D 50k units x6wks (10/2020)  # Bacterial superinfection on scalp  - Current Tx: BPO wash  - s/p Keflex 500mg BID    ___________________________________________    Assessment & Plan:    # Psoriasis   # Alopecia areata  Psoriasis continues to respond to current regimen. However, alopecia areata has not responded to ILK. Discussed next step options in treatment including methotrexate vs tofacitinib. After discussion, patient would like to speak with family to determine best next course of action. Will continue with current regimen for now and patient will reach  out about medication decision  - Continue with enbrel 50mg BIW  - Provided information about methotrexate and tofacitinib   - Will plan for 3m follow up in order to have started medication  - Photodocumentation obtained in clinic     Procedures Performed:  None    Follow-up: 3m    Staff Involved:  Patient was seen and staffed with attending physician Dr. Everett Culver MD  Med/Derm Resident PGY-4  P:[phone]    Staff Addendum:  I, Kristal Floyd MD, saw this patient with the resident and agree with the resident s findings and plan of care as documented in the resident s note.  ___________________________________________      CC: Psoriasis (follow up with psoriasis ) and Derm Problem (follow up with alopecia )      HPI:  Mr. Sulaiman Mccray is a(n) 22 year old male who presents to clinic today for psoriasis and alopecia areata follow up. Reports:  - no change with ILK  - has noted persistent hair loss affecting eyebrows, eyelashes, and body  - no issues with psoriasis  - otherwise feeling well in usual state of health    Physical exam:  General: in no acute distress, well-developed, well-nourished  Skin:  - skin type: light brown  - unchanged alopecic patches on scalp  - continued improvement in psoriatic plaques  - No other lesions of concern on areas examined.             Medications:  Current Outpatient Medications   Medication     benzoyl peroxide (PANOXYL) 10 % external liquid     clobetasol (TEMOVATE) 0.05 % external ointment     etanercept (ENBREL SURECLICK) 50 MG/ML autoinjector     chlorhexidine (HIBICLENS) 4 % liquid     cholecalciferol (VITAMIN D-1000 MAX ST) 1000 units TABS     fexofenadine (ALLEGRA) 180 MG tablet     triamcinolone (KENALOG) 0.1 % external ointment     Current Facility-Administered Medications   Medication     triamcinolone acetonide (KENALOG-10) injection 10 mg     triamcinolone acetonide (KENALOG-10) injection 20 mg     triamcinolone acetonide (KENALOG-10)  injection 20 mg     triamcinolone acetonide (KENALOG-10) injection 30 mg     triamcinolone acetonide (KENALOG-10) injection 40 mg     triamcinolone acetonide (KENALOG-10) injection 40 mg      Past Medical History:   Patient Active Problem List   Diagnosis     ADHD (attention deficit hyperactivity disorder)     Alopecia areata     Anxiety     Major depressive disorder, single episode, mild (H)     Suicidal ideation     No past medical history on file.    CC Referred Self  No address on file on close of this encounter.      Again, thank you for allowing me to participate in the care of your patient.      Sincerely,    Marcin Culver MD

## 2022-03-24 NOTE — PATIENT INSTRUCTIONS
1. Hair loss  - Since the steroid injections aren't really helping with the hairloss, we'd like to add on or switch to a different medication  - our two options are: 1. methotrexate or 2. tofacitinib (xeljanz)  - both can be used to treat both the hair loss and the psoriasis  - please see below for the information about these medications  - once you have decided which one or neither (that's also ok to choose!), please just let us know so we can get started on it

## 2022-03-24 NOTE — NURSING NOTE
Dermatology Rooming Note    Sulaiman Mccray's goals for this visit include:   Chief Complaint   Patient presents with     Psoriasis     follow up with psoriasis      Derm Problem     follow up with alopecia      Meseret Marr, Visit Facilitator

## 2022-03-24 NOTE — LETTER
Date:March 25, 2022      Provider requested that no letter be sent. Do not send.       [facility]

## 2022-05-17 ENCOUNTER — APPOINTMENT (OUTPATIENT)
Dept: INTERPRETER SERVICES | Facility: CLINIC | Age: 22
End: 2022-05-17
Payer: COMMERCIAL

## 2022-06-01 NOTE — PROGRESS NOTES
[facility] Dermatology Note   Encounter Date: Jun 2, 2022  Office visit   Lenard/Palomo GRANDE     Dermatology Problem List:  # Psoriasis (~60% BSA initially; now <5%)  - bx 12/30/19 sebopsoriasis  - current: Enbrel 50mg weekly, clobetasol ointment  - QFN negative 1/2022  - Xeljanz pending  - prior: ILK10 arms 6/17/21, triam 0.1% oint, Humira (ineffective)  # Alopecia areata  - bx 12/30/19  - ILK 10 10/22/20 (2cc), 6/17/21 (2cc), 11/18/21 (6cc ILK10, 1cc ILK5), 1/27/22 (3cc ILK10)  - Xeljanz pending  - prior: doxy (GI side effects), triam 0.1 oint, squaric acid  # Hx vitamin D deficiency  - vitamin D 12 10/2020, s/p 50K international unit(s) x 6w (10/2020)  # Bacterial superinfection on scalp  - current: BPO wash  - s/p Keflex 500mg BID  __________________________________    Assessment & Plan:    # Psoriasis, plaque type  # Alopecia areata universalis  - AA very poorly responsive to ILK, has had multiple diffuse injections on the scalp with limited regrowth, discussed repeating ILK today but patient elected to hold off, psoriasis adequately controlled with Enbrel, but still not at treatment goal with persistent plaques on the arms and back, will plan to pursue approval for Xeljanz 5 mg BID and bridge/taper Enbrel (50 mg every 2 weeks) if Xeljanz is approved as literature shows time to onset of benefit for psoriasis with Xeljanz is around 6 weeks  - discussed risks and benefits of tofacitinib including infection, lymphoma, malignancies, bone marrow suppression, liver damage, shingles, myalgias, kidney damage, vaccination reviewed, denies cardiac disease, no history of GI perforation, no history of insterstitial lung disease; counseled on different response for each patient  - repeat Xeljanz screening labs: CBC with differential, CMP, lipids, HIV, hepatitis B and C  - continue Enbrel 50 mg weekly  - QFN due 1/2023   - counseled on limited UV sun exposure for psoriasis on the arms     Procedures  Performed:  None    References:  Royce T, Zackery ND, Teresa ROSE. Improvement of Psoriasis, Psoriatic Arthritis, and Alopecia Universalis during Treatment with Tofacitinib: A Case Report. Case Rep Dermatol. 2020 Aug 18;12(2):150-154. doi: 10.1159/722228046. PMID: 52951685; PMCID: BKY6874490.    Rivka BG,  BA. Killing two birds with one stone: oral tofacitinib reverses alopecia universalis in a patient with plaque psoriasis. J Invest Dermatol. 2014 Dec;134(12):2779-4837. doi: 10.1038/jid.2014.260. Epub 2014 Jun 18. PMID: 42986989.    Follow-up: 3 months  Faculty: Dr. Merchant    Staff Involved:  Resident/Staff    Monserrat Novoa MD  Dermatology Resident  [facility]    I have personally examined this patient and agree with the resident doctor's documentation and plan of care. I have reviewed and amended the resident's note above. The documentation accurately reflects my clinical observations, diagnoses, treatment and follow-up plans. I was present for key portions of the procedure.       Jena Merchant MD  Dermatology Staff    __________________________________    HPI:  Mr. Sulaiman Mccray is a(n) 22 year old male presenting for:  - psoriasis and alopecia areata follow-up, seen 3/24/22  - on Enbrel, seen 3/24/22, overall psoriasis doing okay, some more spots on the arms, denies joint pain  - bothered by hair loss, worried about liver-related side effects of alternative medications (presumably methotrexate), but interested in trying something else  - states injections on the scalp are only somewhat helpful, not interested in more injections today    Physical exam:  General: in no acute distress, well-appearing  Skin: focused  - skin type: light brown  - diffuse smooth alopecic patches on scalp with rare jacquelin of regrowth  - scalp, arms, lower back: well-demarcated pink lichenified plaques with overlying silvery scale                Medications:  Current Outpatient Medications   Medication     benzoyl  peroxide (PANOXYL) 10 % external liquid     chlorhexidine (HIBICLENS) 4 % liquid     cholecalciferol (VITAMIN D-1000 MAX ST) 1000 units TABS     clobetasol (TEMOVATE) 0.05 % external ointment     etanercept (ENBREL SURECLICK) 50 MG/ML autoinjector     fexofenadine (ALLEGRA) 180 MG tablet     triamcinolone (KENALOG) 0.1 % external ointment     Current Facility-Administered Medications   Medication     triamcinolone acetonide (KENALOG-10) injection 10 mg     triamcinolone acetonide (KENALOG-10) injection 20 mg     triamcinolone acetonide (KENALOG-10) injection 20 mg     triamcinolone acetonide (KENALOG-10) injection 30 mg     triamcinolone acetonide (KENALOG-10) injection 40 mg     triamcinolone acetonide (KENALOG-10) injection 40 mg      Past Medical History:   Patient Active Problem List   Diagnosis     ADHD (attention deficit hyperactivity disorder)     Alopecia areata     Anxiety     Major depressive disorder, single episode, mild (H)     Suicidal ideation     No past medical history on file.    CC Referred Self  No address on file on close of this encounter.

## 2022-06-02 ENCOUNTER — OFFICE VISIT (OUTPATIENT)
Dept: DERMATOLOGY | Facility: CLINIC | Age: 22
End: 2022-06-02
Payer: COMMERCIAL

## 2022-06-02 ENCOUNTER — LAB (OUTPATIENT)
Dept: LAB | Facility: CLINIC | Age: 22
End: 2022-06-02
Payer: COMMERCIAL

## 2022-06-02 DIAGNOSIS — Z51.81 MEDICATION MONITORING ENCOUNTER: ICD-10-CM

## 2022-06-02 DIAGNOSIS — L63.9 ALOPECIA AREATA: Primary | ICD-10-CM

## 2022-06-02 DIAGNOSIS — L40.9 PSORIASIS: ICD-10-CM

## 2022-06-02 LAB
ALBUMIN SERPL-MCNC: 3.8 G/DL (ref 3.4–5)
ALP SERPL-CCNC: 90 U/L (ref 40–150)
ALT SERPL W P-5'-P-CCNC: 110 U/L (ref 0–70)
ANION GAP SERPL CALCULATED.3IONS-SCNC: 5 MMOL/L (ref 3–14)
AST SERPL W P-5'-P-CCNC: 47 U/L (ref 0–45)
BASOPHILS # BLD AUTO: 0.1 10E3/UL (ref 0–0.2)
BASOPHILS NFR BLD AUTO: 1 %
BILIRUB SERPL-MCNC: 0.6 MG/DL (ref 0.2–1.3)
BUN SERPL-MCNC: 11 MG/DL (ref 7–30)
CALCIUM SERPL-MCNC: 9.1 MG/DL (ref 8.5–10.1)
CHLORIDE BLD-SCNC: 109 MMOL/L (ref 94–109)
CO2 SERPL-SCNC: 27 MMOL/L (ref 20–32)
CREAT SERPL-MCNC: 0.77 MG/DL (ref 0.66–1.25)
EOSINOPHIL # BLD AUTO: 1.7 10E3/UL (ref 0–0.7)
EOSINOPHIL NFR BLD AUTO: 22 %
ERYTHROCYTE [DISTWIDTH] IN BLOOD BY AUTOMATED COUNT: 12 % (ref 10–15)
GFR SERPL CREATININE-BSD FRML MDRD: >90 ML/MIN/1.73M2
GLUCOSE BLD-MCNC: 107 MG/DL (ref 70–99)
HBV CORE AB SERPL QL IA: NONREACTIVE
HBV CORE IGM SERPL QL IA: NONREACTIVE
HBV SURFACE AB SERPL IA-ACNC: 2.19 M[IU]/ML
HBV SURFACE AG SERPL QL IA: NONREACTIVE
HCT VFR BLD AUTO: 44.4 % (ref 40–53)
HCV AB SERPL QL IA: NONREACTIVE
HGB BLD-MCNC: 15.2 G/DL (ref 13.3–17.7)
HIV 1+2 AB+HIV1 P24 AG SERPL QL IA: NONREACTIVE
IMM GRANULOCYTES # BLD: 0 10E3/UL
IMM GRANULOCYTES NFR BLD: 0 %
LYMPHOCYTES # BLD AUTO: 2.7 10E3/UL (ref 0.8–5.3)
LYMPHOCYTES NFR BLD AUTO: 33 %
MCH RBC QN AUTO: 29.7 PG (ref 26.5–33)
MCHC RBC AUTO-ENTMCNC: 34.2 G/DL (ref 31.5–36.5)
MCV RBC AUTO: 87 FL (ref 78–100)
MONOCYTES # BLD AUTO: 0.8 10E3/UL (ref 0–1.3)
MONOCYTES NFR BLD AUTO: 10 %
NEUTROPHILS # BLD AUTO: 2.7 10E3/UL (ref 1.6–8.3)
NEUTROPHILS NFR BLD AUTO: 34 %
NRBC # BLD AUTO: 0 10E3/UL
NRBC BLD AUTO-RTO: 0 /100
PLATELET # BLD AUTO: 226 10E3/UL (ref 150–450)
POTASSIUM BLD-SCNC: 4.1 MMOL/L (ref 3.4–5.3)
PROT SERPL-MCNC: 7.8 G/DL (ref 6.8–8.8)
RBC # BLD AUTO: 5.11 10E6/UL (ref 4.4–5.9)
SODIUM SERPL-SCNC: 141 MMOL/L (ref 133–144)
WBC # BLD AUTO: 8.1 10E3/UL (ref 4–11)

## 2022-06-02 PROCEDURE — 86803 HEPATITIS C AB TEST: CPT | Mod: 90 | Performed by: PATHOLOGY

## 2022-06-02 PROCEDURE — 99000 SPECIMEN HANDLING OFFICE-LAB: CPT | Performed by: PATHOLOGY

## 2022-06-02 PROCEDURE — 86704 HEP B CORE ANTIBODY TOTAL: CPT | Mod: 90 | Performed by: PATHOLOGY

## 2022-06-02 PROCEDURE — 86705 HEP B CORE ANTIBODY IGM: CPT | Mod: 90 | Performed by: PATHOLOGY

## 2022-06-02 PROCEDURE — 36415 COLL VENOUS BLD VENIPUNCTURE: CPT | Performed by: PATHOLOGY

## 2022-06-02 PROCEDURE — 85025 COMPLETE CBC W/AUTO DIFF WBC: CPT | Performed by: PATHOLOGY

## 2022-06-02 PROCEDURE — 86706 HEP B SURFACE ANTIBODY: CPT | Mod: 90 | Performed by: PATHOLOGY

## 2022-06-02 PROCEDURE — 99214 OFFICE O/P EST MOD 30 MIN: CPT | Mod: GC | Performed by: STUDENT IN AN ORGANIZED HEALTH CARE EDUCATION/TRAINING PROGRAM

## 2022-06-02 PROCEDURE — 87389 HIV-1 AG W/HIV-1&-2 AB AG IA: CPT | Mod: 90 | Performed by: PATHOLOGY

## 2022-06-02 PROCEDURE — 80053 COMPREHEN METABOLIC PANEL: CPT | Performed by: PATHOLOGY

## 2022-06-02 PROCEDURE — 87340 HEPATITIS B SURFACE AG IA: CPT | Mod: 90 | Performed by: PATHOLOGY

## 2022-06-02 RX ORDER — CLOBETASOL PROPIONATE 0.5 MG/G
OINTMENT TOPICAL 2 TIMES DAILY
Qty: 45 G | Refills: 3 | Status: SHIPPED | OUTPATIENT
Start: 2022-06-02 | End: 2024-09-06

## 2022-06-02 ASSESSMENT — PAIN SCALES - GENERAL: PAINLEVEL: NO PAIN (0)

## 2022-06-02 NOTE — NURSING NOTE
Dermatology Rooming Note    Sulaiman Mccray's goals for this visit include:   Chief Complaint   Patient presents with     Derm Problem     Sulaiman is here for a follow-up on the psoriasis. States his condition has remained the same since he was last seen.      Hair Loss     Sulaiman is here for a follow-up on alopecia areata. States his condition has remained the same since he was last seen.     Samson Lr, EMT

## 2022-06-02 NOTE — NURSING NOTE
Drug Administration Record    Prior to injection, verified patient identity using patient's name and date of birth.  Due to injection administration, patient instructed to remain in clinic for 15 minutes  afterwards, and to report any adverse reaction to me immediately.    Drug Name: triamcinolone acetonide(kenalog)  Dose: 3.0mL of triamcinolone 10mg/mL, 30mg dose  Route administered: ID  NDC #: Kenalog-10 (9806-4770-28)  Amount of waste(mL):2.0mL  Reason for waste: Multi dose vial    LOT #: [id number]  SITE: See Provider Note  : Retina Implant  EXPIRATION DATE: SEP 2023

## 2022-06-02 NOTE — LETTER
6/2/2022       RE: Sulaiman Mccray  [address]  [place] [facility] [address]     Dear Colleague,    Thank you for referring your patient, Sulaiman Mccray, to the [facility] DERMATOLOGY CLINIC [place] at [facility]. Please see a copy of my visit note below.    [facility] Dermatology Note   Encounter Date: Jun 2, 2022  Office visit   Lenard/Palomo GRANDE     Dermatology Problem List:  # Psoriasis (~60% BSA initially; now <5%)  - bx scalp sebopsoriasis  - s/p ILK-10 to arms 6/17/21 (2cc)  - Current Tx: Enbrel 50mg qweek              - Last QuantGold (1/2022): negative  - Adjunct Tx: clobetasol  - Prior:triam 0.1% oint, Humira (ineffective)  # Alopecia areata  - bx 12/30/19  - s/p ILK-10 scalp 10/22/20 (2cc), 6/17/21 (2cc), 11/18/21 (6cc ILK10, 1cc ILK5), 1/27/22 (3cc ILK10)  - Current Tx: Enbrel (for psoriasis) and clobetasol oint              - Planning to transition to either methotrexate or tofacitinib  - Prior Tx: Humira (ineffective), doxycycline (stopped 2/2 GI upset), Hibiclens 3x/week, triam 0.1 oint, ILK qmonth x 8-9 months [place] [facility] [place] as well as allergic contact dermatitis sensitization, ferrous sulfate 325mg daily, Vitamin C 100mg daily   # Vitamin D deficiency  - Last level (10/2020): 12  - s/p Vit D 50k units x6wks (10/2020)  # Bacterial superinfection on scalp  - Current Tx: BPO wash  - s/p Keflex 500mg BID  __________________________________    Assessment & Plan:    # Psoriasis   # Alopecia areata  Psoriasis continues to respond to current regimen. However, alopecia areata has not responded to ILK. Discussed next step options in treatment including methotrexate vs tofacitinib. After discussion, patient would like to speak with family to determine best next course of action. Will continue with current regimen for now and patient will reach out about medication decision  - Continue with enbrel 50mg BIW  - Provided information  about methotrexate and tofacitinib              - Will plan for 3m follow up in order to have started medication  - Photodocumentation obtained in clinic      - discussed risks and benefits of tofacitinib including infection, lymphoma, malignancies, bone marrow suppression, liver damage, shingles, myalgias, kidney damage, vaccination reviewed, denies cardiac disease, no history of GI perforation, no history of insterstitial lung disease; counseled on different response for each patient  - baseline CBC with differential, CMP, fasting lipids, TB, HIV, hepatitis B and C, vital signs  - tofacinitib 5mg BID  - CBC with diff, CMP, lipids in 1 month, then 3 months if stable       Procedures Performed:  {[id number]}    Follow-up: ***  Faculty: ***    Staff Involved:  Resident/Staff    Monserrat Novoa MD  Dermatology Resident  [facility]    ***  __________________________________    HPI:  Mr. Sulaiman Mccray is a(n) 22 year old male presenting for:  - psoriasis and alopecia areata follow-up  - on Enbrel, seen 3/24/22      Physical exam:  General: in no acute distress, well-appearing  Skin: focused  - skin type: light brown  - unchanged alopecic patches on scalp  - continued improvement in psoriatic plaques  - No other lesions of concern on areas examined.     Medications:  Current Outpatient Medications   Medication     benzoyl peroxide (PANOXYL) 10 % external liquid     chlorhexidine (HIBICLENS) 4 % liquid     cholecalciferol (VITAMIN D-1000 MAX ST) 1000 units TABS     clobetasol (TEMOVATE) 0.05 % external ointment     etanercept (ENBREL SURECLICK) 50 MG/ML autoinjector     fexofenadine (ALLEGRA) 180 MG tablet     triamcinolone (KENALOG) 0.1 % external ointment     Current Facility-Administered Medications   Medication     triamcinolone acetonide (KENALOG-10) injection 10 mg     triamcinolone acetonide (KENALOG-10) injection 20 mg     triamcinolone acetonide (KENALOG-10) injection 20 mg     triamcinolone  acetonide (KENALOG-10) injection 30 mg     triamcinolone acetonide (KENALOG-10) injection 40 mg     triamcinolone acetonide (KENALOG-10) injection 40 mg      Past Medical History:   Patient Active Problem List   Diagnosis     ADHD (attention deficit hyperactivity disorder)     Alopecia areata     Anxiety     Major depressive disorder, single episode, mild (H)     Suicidal ideation     No past medical history on file.    CC Referred Self  No address on file on close of this encounter.      Again, thank you for allowing me to participate in the care of your patient.      Sincerely,    Monserrat Novoa MD

## 2022-06-02 NOTE — LETTER
Date:June 17, 2022      Provider requested that no letter be sent. Do not send.       [facility]

## 2022-06-07 NOTE — NURSING NOTE
Chief Complaint   Patient presents with     Establish Care     Kimberly Nissen, EMT at 4:03 PM on 12/11/2020    Video Visit Technology for this patient: Mary not working, patient has smart device, please try Doximity Video with patient    
no

## 2022-09-18 ENCOUNTER — HEALTH MAINTENANCE LETTER (OUTPATIENT)
Age: 22
End: 2022-09-18

## 2022-09-29 ENCOUNTER — TELEPHONE (OUTPATIENT)
Dept: DERMATOLOGY | Facility: CLINIC | Age: 22
End: 2022-09-29

## 2022-09-29 ENCOUNTER — OFFICE VISIT (OUTPATIENT)
Dept: DERMATOLOGY | Facility: CLINIC | Age: 22
End: 2022-09-29
Payer: COMMERCIAL

## 2022-09-29 DIAGNOSIS — L40.9 PSORIASIS: Primary | ICD-10-CM

## 2022-09-29 DIAGNOSIS — L63.9 ALOPECIA AREATA: ICD-10-CM

## 2022-09-29 DIAGNOSIS — K21.9 GASTROESOPHAGEAL REFLUX DISEASE, UNSPECIFIED WHETHER ESOPHAGITIS PRESENT: ICD-10-CM

## 2022-09-29 PROCEDURE — 99214 OFFICE O/P EST MOD 30 MIN: CPT | Mod: 25 | Performed by: STUDENT IN AN ORGANIZED HEALTH CARE EDUCATION/TRAINING PROGRAM

## 2022-09-29 PROCEDURE — 11901 INJECT SKIN LESIONS >7: CPT | Mod: GC | Performed by: STUDENT IN AN ORGANIZED HEALTH CARE EDUCATION/TRAINING PROGRAM

## 2022-09-29 ASSESSMENT — PAIN SCALES - GENERAL: PAINLEVEL: NO PAIN (0)

## 2022-09-29 NOTE — LETTER
Date:October 7, 2022      Provider requested that no letter be sent. Do not send.       [facility]

## 2022-09-29 NOTE — TELEPHONE ENCOUNTER
PA Initiation    Medication: Olumiant  Ins[facility] Phone [phone] Fax [phone]  Pharmacy Filling the Rx: [facility] - [place], [place] - [facility] [address]  Filling Pharmacy Phone:    Filling Pharmacy Fax:    Start Date: 9/29/2022    Key: [id number]

## 2022-09-29 NOTE — PROGRESS NOTES
I have personally examined this patient and agree with the resident doctor's documentation and plan of care. I have reviewed and amended the resident's note above. The documentation accurately reflects my clinical observations, diagnoses, treatment and follow-up plans. I was present for key portions of the procedure(s).       Trent Zavala MD  Dermatology Staff        [facility] Dermatology Note   Encounter Date: Sep 29, 2022  Office visit    Dermatology Problem List:    Lenard/Palomo CC    # Psoriasis (~60% BSA initially; now <10-15%)  - bx 12/30/19 sebopsoriasis  - current: Enbrel 50mg weekly, clobetasol ointment   - QFN negative 1/2022  - Pending Tx: baricitinib vs tofacitinib (in order to also treat severe aa)  - prior: ILK10 arms 6/17/21, triam 0.1% oint, Humira (ineffective)  # Alopecia areata  - bx 12/30/19  - s/p numerous ILK 10 10/22/20 (2cc), 6/17/21 (2cc), 11/18/21 (6cc ILK10, 1cc ILK5), 1/27/22 (3cc ILK10) without much relief  - Pending Tx: baricitinib vs tofacitinib (in order to also treat severe aa)  - prior: doxy (GI side effects), triam 0.1 oint, squaric acid  # Hx vitamin D deficiency  - vitamin D 12 10/2020, s/p 50K international unit(s) x 6w (10/2020)  # Bacterial superinfection on scalp  - current: BPO wash  - s/p Keflex 500mg BID    ___________________________________________    Assessment & Plan:    # Alopecia areata, severe  Condition continues to be unchanged with current regimen. Will submit prior auths for both tofacitinib and baricitinib. Will plan to start on whichever medication (if either) is approved by insurance  Plan:  - Prescribed tofacitinib 5mg BID for prior auth  - Prescribed bariticinib 2mg daily for prior auth    # Psoriasis  Condition is moderately controlled on current regimen. Continues to have breakthru plaque formation while on Enbrel. Agreeable to repeat ILK today in clinic  Plan:  - See procedure note below  - Continue with enbrel weekly   - May also  consider increasing to BIW    # Reflux  Based off H&P, previous chest pain most consistent with reflux. Agreeable to trial of PPI daily x8 weeks  Plan:  - Prescribed omeprazole 20mg daily     Procedures Performed:  - Kenalog intralesional injection procedure note: after verbal consent and discussion of risks including but not limited to atrophy, pain, and bruising, time out was performed, patient positioned, area cleaned with alcohol, 2.5 ml Kenalog 10 mg/ml injected into >7 sites on arms and back; patient tolerated procedure well    Follow-up: 7 weeks    Staff Involved:  Patient was seen and staffed with attending physician Dr. Sally Culver MD  Med/Derm Resident PGY-5  P:[phone]    Staff Addendum:    ___________________________________________      CC: Derm Problem (JOSE ALEJANDRO - Sulaiman states there has not been any improvement )      HPI:  Mr. Sulaiman Mccray is a(n) 22 year old male who presents to clinic today for psoriasis and alopecia areata follow up. Reports:  - condition has worsened since last office visit  - has more plaques on the arms and back  - concerned about enbrel due to new chest pain  - described the chest pain as a substernal chest pain that is worse with spicy foods and caffeine. Denies any radiation of the pain  - has not noted any hair regrowth  - otherwise feeling well in usual state of health    Physical exam:  General: in no acute distress, well-developed, well-nourished  Skin:  - skin type: light brown  - complete hair loss affecting scalp, eyebrows, and eyelashes  - multiple well-demarcated erythematous plaques with micaceous scale on scalp, back, arms, and elbows   - hyperpigmented patches on previous psoriatic plaques  - No other lesions of concern on areas examined.     Medications:  Current Outpatient Medications   Medication     baricitinib (OLUMIANT) 2 MG tablet     benzoyl peroxide (PANOXYL) 10 % external liquid     clobetasol (TEMOVATE) 0.05 % external ointment      etanercept (ENBREL SURECLICK) 50 MG/ML autoinjector     omeprazole (PRILOSEC) 20 MG DR capsule     tofacitinib (XELJANZ) 5 MG tablet     chlorhexidine (HIBICLENS) 4 % liquid     cholecalciferol 25 MCG (1000 UT) TABS     fexofenadine (ALLEGRA) 180 MG tablet     triamcinolone (KENALOG) 0.1 % external ointment     Current Facility-Administered Medications   Medication     triamcinolone acetonide (KENALOG-10) injection 10 mg     triamcinolone acetonide (KENALOG-10) injection 20 mg     triamcinolone acetonide (KENALOG-10) injection 20 mg     triamcinolone acetonide (KENALOG-10) injection 30 mg     triamcinolone acetonide (KENALOG-10) injection 30 mg     triamcinolone acetonide (KENALOG-10) injection 40 mg     triamcinolone acetonide (KENALOG-10) injection 40 mg      Past Medical History:   Patient Active Problem List   Diagnosis     ADHD (attention deficit hyperactivity disorder)     Alopecia areata     Anxiety     Major depressive disorder, single episode, mild (H)     Suicidal ideation     No past medical history on file.    CC Referred Self, MD  No address on file on close of this encounter.

## 2022-09-29 NOTE — NURSING NOTE
Dermatology Rooming Note    Sulaiman Mccray's goals for this visit include:   Chief Complaint   Patient presents with     Derm Problem     JOSE ALEJANDRO Hilario states there has not been any improvement      Carlito Barker, CMA

## 2022-09-29 NOTE — NURSING NOTE
Drug Administration Record    Prior to injection, verified patient identity using patient's name and date of birth.  Due to injection administration, patient instructed to remain in clinic for 15 minutes  afterwards, and to report any adverse reaction to me immediately.    Drug Name: triamcinolone acetonide(kenalog)  Dose: 3mL of triamcinolone 10mg/mL, 30mg dose  Route administered: ID  NDC #: Kenalog-10 (0010-0249-14)  Amount of waste(mL):2  Reason for waste: Multi dose vial    LOT #: [id number]  SITE: Scalp  : [employer]  EXPIRATION DATE: 04/2024

## 2022-09-29 NOTE — LETTER
9/29/2022       RE: Sulaiman Mccray  [address]  [facility] [address]     Dear Colleague,    Thank you for referring your patient, Sulaiman Mccray, to the [facility] DERMATOLOGY CLINIC [place] at [facility]. Please see a copy of my visit note below.    I have personally examined this patient and agree with the resident doctor's documentation and plan of care. I have reviewed and amended the resident's note above. The documentation accurately reflects my clinical observations, diagnoses, treatment and follow-up plans. I was present for key portions of the procedure(s).       Trent Zavala MD  Dermatology Staff        [facility] Dermatology Note   Encounter Date: Sep 29, 2022  Office visit    Dermatology Problem List:    Lenard/Palomo CC    # Psoriasis (~60% BSA initially; now <10-15%)  - bx 12/30/19 sebopsoriasis  - current: Enbrel 50mg weekly, clobetasol ointment   - QFN negative 1/2022  - Pending Tx: baricitinib vs tofacitinib (in order to also treat severe aa)  - prior: ILK10 arms 6/17/21, triam 0.1% oint, Humira (ineffective)  # Alopecia areata  - bx 12/30/19  - s/p numerous ILK 10 10/22/20 (2cc), 6/17/21 (2cc), 11/18/21 (6cc ILK10, 1cc ILK5), 1/27/22 (3cc ILK10) without much relief  - Pending Tx: baricitinib vs tofacitinib (in order to also treat severe aa)  - prior: doxy (GI side effects), triam 0.1 oint, squaric acid  # Hx vitamin D deficiency  - vitamin D 12 10/2020, s/p 50K international unit(s) x 6w (10/2020)  # Bacterial superinfection on scalp  - current: BPO wash  - s/p Keflex 500mg BID    ___________________________________________    Assessment & Plan:    # Alopecia areata, severe  Condition continues to be unchanged with current regimen. Will submit prior auths for both tofacitinib and baricitinib. Will plan to start on whichever medication (if either) is approved by insurance  Plan:  - Prescribed  tofacitinib 5mg BID for prior auth  - Prescribed bariticinib 2mg daily for prior auth    # Psoriasis  Condition is moderately controlled on current regimen. Continues to have breakthru plaque formation while on Enbrel. Agreeable to repeat ILK today in clinic  Plan:  - See procedure note below  - Continue with enbrel weekly   - May also consider increasing to BIW    # Reflux  Based off H&P, previous chest pain most consistent with reflux. Agreeable to trial of PPI daily x8 weeks  Plan:  - Prescribed omeprazole 20mg daily     Procedures Performed:  - Kenalog intralesional injection procedure note: after verbal consent and discussion of risks including but not limited to atrophy, pain, and bruising, time out was performed, patient positioned, area cleaned with alcohol, 2.5 ml Kenalog 10 mg/ml injected into >7 sites on arms and back; patient tolerated procedure well    Follow-up: 7 weeks    Staff Involved:  Patient was seen and staffed with attending physician Dr. Sally Culver MD  Med/Derm Resident PGY-5  P:[phone]    Staff Addendum:    ___________________________________________      CC: Derm Problem (JOSE ALEJANDRO Hilario states there has not been any improvement )      HPI:  Mr. Sulaiman Mccray is a(n) 22 year old male who presents to clinic today for psoriasis and alopecia areata follow up. Reports:  - condition has worsened since last office visit  - has more plaques on the arms and back  - concerned about enbrel due to new chest pain  - described the chest pain as a substernal chest pain that is worse with spicy foods and caffeine. Denies any radiation of the pain  - has not noted any hair regrowth  - otherwise feeling well in usual state of health    Physical exam:  General: in no acute distress, well-developed, well-nourished  Skin:  - skin type: light brown  - complete hair loss affecting scalp, eyebrows, and eyelashes  - multiple well-demarcated erythematous plaques with micaceous scale on  scalp, back, arms, and elbows   - hyperpigmented patches on previous psoriatic plaques  - No other lesions of concern on areas examined.     Medications:  Current Outpatient Medications   Medication     baricitinib (OLUMIANT) 2 MG tablet     benzoyl peroxide (PANOXYL) 10 % external liquid     clobetasol (TEMOVATE) 0.05 % external ointment     etanercept (ENBREL SURECLICK) 50 MG/ML autoinjector     omeprazole (PRILOSEC) 20 MG DR capsule     tofacitinib (XELJANZ) 5 MG tablet     chlorhexidine (HIBICLENS) 4 % liquid     cholecalciferol 25 MCG (1000 UT) TABS     fexofenadine (ALLEGRA) 180 MG tablet     triamcinolone (KENALOG) 0.1 % external ointment     Current Facility-Administered Medications   Medication     triamcinolone acetonide (KENALOG-10) injection 10 mg     triamcinolone acetonide (KENALOG-10) injection 20 mg     triamcinolone acetonide (KENALOG-10) injection 20 mg     triamcinolone acetonide (KENALOG-10) injection 30 mg     triamcinolone acetonide (KENALOG-10) injection 30 mg     triamcinolone acetonide (KENALOG-10) injection 40 mg     triamcinolone acetonide (KENALOG-10) injection 40 mg      Past Medical History:   Patient Active Problem List   Diagnosis     ADHD (attention deficit hyperactivity disorder)     Alopecia areata     Anxiety     Major depressive disorder, single episode, mild (H)     Suicidal ideation     No past medical history on file.    CC Chad Clemens MD  No address on file on close of this encounter.      Again, thank you for allowing me to participate in the care of your patient.      Sincerely,    Marcin Culver MD

## 2022-09-30 NOTE — TELEPHONE ENCOUNTER
Prior Authorization Approval    Authorization Effective Date: 8/30/2022  Authorization Expiration Date: 9/29/2023  Medication: Olumiant  Approved Dose/Quantity: 30 per 30 days   Reference #: Key: [id number]   Insurance Company: [employer] - Phone [phone] Fax [phone]  Expected CoPay: $0.00     CoPay Card Available: No    Foundation Assistance Needed:    Which Pharmacy is filling the prescription (Not needed for infusion/clinic administered): [facility] - [place], [place] - [address]  Pharmacy Notified: No  Patient Notified: No

## 2022-10-03 ENCOUNTER — DOCUMENTATION ONLY (OUTPATIENT)
Dept: DERMATOLOGY | Facility: CLINIC | Age: 22
End: 2022-10-03

## 2022-10-03 NOTE — PROGRESS NOTES
Received message that patient was approved for baricitinib 2mg daily (see pa encounter for full details). After speaking with Dr. TRINA Zavala (staff from continuity clinic visit), will plan to start patient on baricitinib 2mg daily. Discussed with patient via MyChart that he should stop taking enbrel once he starts the new medication. Follow up already scheduled for 11/17/22. GORDON/c'd Xeljanz prior auth given baricitinib approval    Marcin Culver MD  Med/Derm Resident PGY-5  P:[phone]

## 2022-10-04 NOTE — TELEPHONE ENCOUNTER
Informed pharmacy and patient about approval of Olumiant. Prescription sent over to [facility].

## 2022-12-01 ENCOUNTER — OFFICE VISIT (OUTPATIENT)
Dept: DERMATOLOGY | Facility: CLINIC | Age: 22
End: 2022-12-01
Payer: COMMERCIAL

## 2022-12-01 DIAGNOSIS — L40.9 PSORIASIS: Primary | ICD-10-CM

## 2022-12-01 DIAGNOSIS — Z51.81 THERAPEUTIC DRUG MONITORING: ICD-10-CM

## 2022-12-01 DIAGNOSIS — L63.9 ALOPECIA AREATA: ICD-10-CM

## 2022-12-01 PROCEDURE — 99214 OFFICE O/P EST MOD 30 MIN: CPT | Mod: GC | Performed by: STUDENT IN AN ORGANIZED HEALTH CARE EDUCATION/TRAINING PROGRAM

## 2022-12-01 ASSESSMENT — PAIN SCALES - GENERAL: PAINLEVEL: NO PAIN (0)

## 2022-12-01 NOTE — PROGRESS NOTES
"[facility] Dermatology Note   Encounter Date: Dec 1, 2022  Office visit    Dermatology Problem List:    Lenard/Palomo CC    # Psoriasis (~60% BSA initially; now <10-15%)  - bx 12/30/19 sebopsoriasis  - Current Tx: baricitinib 4mg daily   - 2mg x2m ineffective; increased 12/1/22   - Labs due at next office visit  - Previous Tx: Enbrel 50mg (effective for psoriasis. No effective for AA), clobetasol ointment, ILK10 arms 6/17/21, triam 0.1% oint, Humira (ineffective)  # Alopecia areata  - bx 12/30/19  - s/p numerous ILK 10 10/22/20 (2cc), 6/17/21 (2cc), 11/18/21 (6cc ILK10, 1cc ILK5), 1/27/22 (3cc ILK10) without much relief  - Current Tx: baricitinib 4mg daily  - prior: doxy (GI side effects), triam 0.1 oint, squaric acid  # Hx vitamin D deficiency  - vitamin D 12 10/2020, s/p 50K international unit(s) x 6w (10/2020)  # Bacterial superinfection on scalp  - current: BPO wash  - s/p Keflex 500mg BID  ___________________________________________    Assessment & Plan:    # Psoriasis  # Alopecia areata  Condition has worsened since transitioning to baricitinib 2mg daily. Endorses taking medication without difficulty. Will increase to 4mg daily and reassess in 2m  - Increase baricitinib to 4mg daily   - Sent prescription to specialty pharmacy   - Will do additional 2m. If ineffective, will transition back to Enbrel   - Will obtain safety labs at next office visit (ordered)  - Continue with topcials     Procedures Performed:  None    Follow-up: 2m    Staff Involved:  Patient was seen and staffed with attending physician Dr. Sally Culver MD  Med/Derm Resident PGY-5  P:[phone]    ___________________________________________      CC: Derm Problem (Sulaiman is here today for alopecia. He states \" my hair loss is worse today\")      HPI:  Mr. Sulaiman Mccray is a(n) 22 year old male who presents to clinic today for psoriasis and alopecia areata follow up after starting baricitinib 2ng daily. " Presents with mom. [employer] iPad  was used. Reports:  - has been taking medication as prescribed  - notes no improvement in conditions  - notes that psoriasis is worsening and returning (arms, back, and scalp)  - minimal improvement with topical steroids  - otherwise feeling well in usual state of health    Physical exam:  General: in no acute distress, well-developed, well-nourished  Skin:  - skin type: light brown  - complete hair loss affecting scalp, eyebrows, and eyelashes. Follicular ostia still present  - well-defined erythematous plaques with overlying scale in the scalp, back, arms, and flanks (increased from prior)  - No other lesions of concern on areas examined.     Medications:  Current Outpatient Medications   Medication     baricitinib (OLUMIANT) 2 MG tablet     clobetasol (TEMOVATE) 0.05 % external ointment     omeprazole (PRILOSEC) 20 MG DR capsule     benzoyl peroxide (PANOXYL) 10 % external liquid     chlorhexidine (HIBICLENS) 4 % liquid     cholecalciferol 25 MCG (1000 UT) TABS     etanercept (ENBREL SURECLICK) 50 MG/ML autoinjector     fexofenadine (ALLEGRA) 180 MG tablet     triamcinolone (KENALOG) 0.1 % external ointment     Current Facility-Administered Medications   Medication     triamcinolone acetonide (KENALOG-10) injection 10 mg     triamcinolone acetonide (KENALOG-10) injection 20 mg     triamcinolone acetonide (KENALOG-10) injection 20 mg     triamcinolone acetonide (KENALOG-10) injection 30 mg     triamcinolone acetonide (KENALOG-10) injection 30 mg     triamcinolone acetonide (KENALOG-10) injection 40 mg     triamcinolone acetonide (KENALOG-10) injection 40 mg      Past Medical History:   Patient Active Problem List   Diagnosis     ADHD (attention deficit hyperactivity disorder)     Alopecia areata     Anxiety     Major depressive disorder, single episode, mild (H)     Suicidal ideation     No past medical history on file.    CC Referred Self, MD  No address on file on  close of this encounter.

## 2022-12-01 NOTE — LETTER
12/1/2022       RE: Sulaiman Mccray  [address]  [facility] [address]     Dear Colleague,    Thank you for referring your patient, Sulaiman Mccray, to the [facility] DERMATOLOGY CLINIC [place] at [facility]. Please see a copy of my visit note below.    [facility] Dermatology Note   Encounter Date: Dec 1, 2022  Office visit    Dermatology Problem List:    Lenard/Palomo CC    # Psoriasis (~60% BSA initially; now <10-15%)  - bx 12/30/19 sebopsoriasis  - Current Tx: baricitinib 4mg daily   - 2mg x2m ineffective; increased 12/1/22   - Labs due at next office visit  - Previous Tx: Enbrel 50mg (effective for psoriasis. No effective for AA), clobetasol ointment, ILK10 arms 6/17/21, triam 0.1% oint, Humira (ineffective)  # Alopecia areata  - bx 12/30/19  - s/p numerous ILK 10 10/22/20 (2cc), 6/17/21 (2cc), 11/18/21 (6cc ILK10, 1cc ILK5), 1/27/22 (3cc ILK10) without much relief  - Current Tx: baricitinib 4mg daily  - prior: doxy (GI side effects), triam 0.1 oint, squaric acid  # Hx vitamin D deficiency  - vitamin D 12 10/2020, s/p 50K international unit(s) x 6w (10/2020)  # Bacterial superinfection on scalp  - current: BPO wash  - s/p Keflex 500mg BID  ___________________________________________    Assessment & Plan:    # Psoriasis  # Alopecia areata  Condition has worsened since transitioning to baricitinib 2mg daily. Endorses taking medication without difficulty. Will increase to 4mg daily and reassess in 2m  - Increase baricitinib to 4mg daily   - Sent prescription to specialty pharmacy   - Will do additional 2m. If ineffective, will transition back to Enbrel   - Will obtain safety labs at next office visit (ordered)  - Continue with topcials     Procedures Performed:  None    Follow-up: 2m    Staff Involved:  Patient was seen and staffed with attending physician Dr. Sally Culver MD  Med/Derm Resident  "PGY-5  P:[phone]    ___________________________________________      CC: Derm Problem (Sulaiman is here today for alopecia. He states \" my hair loss is worse today\")      HPI:  Mr. Sulaiman Mccray is a(n) 22 year old male who presents to clinic today for psoriasis and alopecia areata follow up after starting baricitinib 2ng daily. Presents with mom. [employer] iPad  was used. Reports:  - has been taking medication as prescribed  - notes no improvement in conditions  - notes that psoriasis is worsening and returning (arms, back, and scalp)  - minimal improvement with topical steroids  - otherwise feeling well in usual state of health    Physical exam:  General: in no acute distress, well-developed, well-nourished  Skin:  - skin type: light brown  - complete hair loss affecting scalp, eyebrows, and eyelashes. Follicular ostia still present  - well-defined erythematous plaques with overlying scale in the scalp, back, arms, and flanks (increased from prior)  - No other lesions of concern on areas examined.     Medications:  Current Outpatient Medications   Medication     baricitinib (OLUMIANT) 2 MG tablet     clobetasol (TEMOVATE) 0.05 % external ointment     omeprazole (PRILOSEC) 20 MG DR capsule     benzoyl peroxide (PANOXYL) 10 % external liquid     chlorhexidine (HIBICLENS) 4 % liquid     cholecalciferol 25 MCG (1000 UT) TABS     etanercept (ENBREL SURECLICK) 50 MG/ML autoinjector     fexofenadine (ALLEGRA) 180 MG tablet     triamcinolone (KENALOG) 0.1 % external ointment     Current Facility-Administered Medications   Medication     triamcinolone acetonide (KENALOG-10) injection 10 mg     triamcinolone acetonide (KENALOG-10) injection 20 mg     triamcinolone acetonide (KENALOG-10) injection 20 mg     triamcinolone acetonide (KENALOG-10) injection 30 mg     triamcinolone acetonide (KENALOG-10) injection 30 mg     triamcinolone acetonide (KENALOG-10) injection 40 mg     triamcinolone acetonide " (KENALOG-10) injection 40 mg      Past Medical History:   Patient Active Problem List   Diagnosis     ADHD (attention deficit hyperactivity disorder)     Alopecia areata     Anxiety     Major depressive disorder, single episode, mild (H)     Suicidal ideation     No past medical history on file.    CC Referred Self, MD  No address on file on close of this encounter.    Attestation signed by Trent Zavala MD at 12/1/2022 12:02 PM:  I have personally examined this patient and agree with the resident doctor's documentation and plan of care. I have reviewed and amended the resident's note above. The documentation accurately reflects my clinical observations, diagnoses, treatment and follow-up plans.     Trent Zavala MD  Dermatology Staff        Again, thank you for allowing me to participate in the care of your patient.      Sincerely,    Marcin Culver MD

## 2022-12-01 NOTE — NURSING NOTE
"Dermatology Rooming Note    Sulaiman Mccray's goals for this visit include:   Chief Complaint   Patient presents with     Derm Problem     Sulaiman is here today for alopecia. He states \" my hair loss is worse today\"     Dorothy Wen, CLIFFORD  "

## 2022-12-01 NOTE — LETTER
Date:December 2, 2022      Patient was self referred, no letter generated. Do not send.        [facility] Health Information

## 2022-12-02 ENCOUNTER — TELEPHONE (OUTPATIENT)
Dept: DERMATOLOGY | Facility: CLINIC | Age: 22
End: 2022-12-02

## 2022-12-02 DIAGNOSIS — L40.9 PSORIASIS: ICD-10-CM

## 2022-12-02 DIAGNOSIS — L63.9 ALOPECIA AREATA: Primary | ICD-10-CM

## 2022-12-02 NOTE — PROGRESS NOTES
Received message that prior auth for baricitinib 4mg daily was approved. Will send medication to patient as discussed in 12/1/22 office visit.    Marcin Culver MD  Med/Derm Resident PGY-5  P:[phone]

## 2022-12-02 NOTE — TELEPHONE ENCOUNTER
PA Initiation    Medication: Olumiant dose chage  Insurance Company: [employer] - Phone [phone] Fax [phone]  Pharmacy Filling the Rx: [facility] - [place], [place] - [facility] [address]  Filling Pharmacy Phone:    Filling Pharmacy Fax:    Start Date: 12/2/2022    Key: [id number]

## 2022-12-02 NOTE — TELEPHONE ENCOUNTER
Prior Authorization Approval    Authorization Effective Date: 11/2/2022  Authorization Expiration Date: 12/2/2023  Medication: Olumiant dose chage  Approved Dose/Quantity: 30 per 30 days   Reference #: Key: [id number]   Insurance Company: [employer] - Phone [phone] Fax [phone]  Expected CoPay: $0.00     CoPay Card Available: No    Foundation Assistance Needed:    Which Pharmacy is filling the prescription (Not needed for infusion/clinic administered): [facility] - [place], [place] - [address]  Pharmacy Notified: No  Patient Notified: No

## 2023-02-02 ENCOUNTER — OFFICE VISIT (OUTPATIENT)
Dept: DERMATOLOGY | Facility: CLINIC | Age: 23
End: 2023-02-02
Payer: COMMERCIAL

## 2023-02-02 ENCOUNTER — LAB (OUTPATIENT)
Dept: LAB | Facility: CLINIC | Age: 23
End: 2023-02-02
Payer: COMMERCIAL

## 2023-02-02 ENCOUNTER — TELEPHONE (OUTPATIENT)
Dept: DERMATOLOGY | Facility: CLINIC | Age: 23
End: 2023-02-02

## 2023-02-02 DIAGNOSIS — L40.9 PSORIASIS: ICD-10-CM

## 2023-02-02 DIAGNOSIS — L63.9 ALOPECIA AREATA: ICD-10-CM

## 2023-02-02 DIAGNOSIS — Z51.81 THERAPEUTIC DRUG MONITORING: ICD-10-CM

## 2023-02-02 LAB
ALBUMIN SERPL BCG-MCNC: 4.4 G/DL (ref 3.5–5.2)
ALP SERPL-CCNC: 92 U/L (ref 40–129)
ALT SERPL W P-5'-P-CCNC: 90 U/L (ref 10–50)
ANION GAP SERPL CALCULATED.3IONS-SCNC: 10 MMOL/L (ref 7–15)
AST SERPL W P-5'-P-CCNC: 45 U/L (ref 10–50)
BASOPHILS # BLD AUTO: 0.1 10E3/UL (ref 0–0.2)
BASOPHILS NFR BLD AUTO: 1 %
BILIRUB SERPL-MCNC: 0.5 MG/DL
BUN SERPL-MCNC: 9.6 MG/DL (ref 6–20)
CALCIUM SERPL-MCNC: 9.2 MG/DL (ref 8.6–10)
CHLORIDE SERPL-SCNC: 105 MMOL/L (ref 98–107)
CHOLEST SERPL-MCNC: 117 MG/DL
CREAT SERPL-MCNC: 0.85 MG/DL (ref 0.67–1.17)
DEPRECATED CALCIDIOL+CALCIFEROL SERPL-MC: 12 UG/L (ref 20–75)
DEPRECATED HCO3 PLAS-SCNC: 24 MMOL/L (ref 22–29)
EOSINOPHIL # BLD AUTO: 1 10E3/UL (ref 0–0.7)
EOSINOPHIL NFR BLD AUTO: 12 %
ERYTHROCYTE [DISTWIDTH] IN BLOOD BY AUTOMATED COUNT: 12.6 % (ref 10–15)
GFR SERPL CREATININE-BSD FRML MDRD: >90 ML/MIN/1.73M2
GLUCOSE SERPL-MCNC: 103 MG/DL (ref 70–99)
HCT VFR BLD AUTO: 45.3 % (ref 40–53)
HDLC SERPL-MCNC: 53 MG/DL
HGB BLD-MCNC: 15.3 G/DL (ref 13.3–17.7)
IMM GRANULOCYTES # BLD: 0 10E3/UL
IMM GRANULOCYTES NFR BLD: 0 %
LDLC SERPL CALC-MCNC: 50 MG/DL
LYMPHOCYTES # BLD AUTO: 2.8 10E3/UL (ref 0.8–5.3)
LYMPHOCYTES NFR BLD AUTO: 33 %
MCH RBC QN AUTO: 29.5 PG (ref 26.5–33)
MCHC RBC AUTO-ENTMCNC: 33.8 G/DL (ref 31.5–36.5)
MCV RBC AUTO: 87 FL (ref 78–100)
MONOCYTES # BLD AUTO: 0.7 10E3/UL (ref 0–1.3)
MONOCYTES NFR BLD AUTO: 9 %
NEUTROPHILS # BLD AUTO: 3.7 10E3/UL (ref 1.6–8.3)
NEUTROPHILS NFR BLD AUTO: 45 %
NONHDLC SERPL-MCNC: 64 MG/DL
NRBC # BLD AUTO: 0 10E3/UL
NRBC BLD AUTO-RTO: 0 /100
PLATELET # BLD AUTO: 232 10E3/UL (ref 150–450)
POTASSIUM SERPL-SCNC: 4.2 MMOL/L (ref 3.4–5.3)
PROT SERPL-MCNC: 7.7 G/DL (ref 6.4–8.3)
RBC # BLD AUTO: 5.19 10E6/UL (ref 4.4–5.9)
SODIUM SERPL-SCNC: 139 MMOL/L (ref 136–145)
TRIGL SERPL-MCNC: 71 MG/DL
WBC # BLD AUTO: 8.4 10E3/UL (ref 4–11)

## 2023-02-02 PROCEDURE — 85025 COMPLETE CBC W/AUTO DIFF WBC: CPT | Performed by: PATHOLOGY

## 2023-02-02 PROCEDURE — 99000 SPECIMEN HANDLING OFFICE-LAB: CPT | Performed by: PATHOLOGY

## 2023-02-02 PROCEDURE — 36415 COLL VENOUS BLD VENIPUNCTURE: CPT | Performed by: PATHOLOGY

## 2023-02-02 PROCEDURE — 86481 TB AG RESPONSE T-CELL SUSP: CPT | Mod: 90 | Performed by: PATHOLOGY

## 2023-02-02 PROCEDURE — 82306 VITAMIN D 25 HYDROXY: CPT | Mod: 90 | Performed by: PATHOLOGY

## 2023-02-02 PROCEDURE — 80061 LIPID PANEL: CPT | Performed by: PATHOLOGY

## 2023-02-02 PROCEDURE — 80053 COMPREHEN METABOLIC PANEL: CPT | Performed by: PATHOLOGY

## 2023-02-02 PROCEDURE — 99214 OFFICE O/P EST MOD 30 MIN: CPT | Mod: GC | Performed by: DERMATOLOGY

## 2023-02-02 ASSESSMENT — PAIN SCALES - GENERAL: PAINLEVEL: NO PAIN (0)

## 2023-02-02 NOTE — PATIENT INSTRUCTIONS
Continue the baricitinib until your secukinumab prescription goes through. Then STOP the baricitinib.  We'll then start the secukinumab  Labs today  3 month follow-up.

## 2023-02-02 NOTE — PROGRESS NOTES
[facility] Dermatology Note   Encounter Date: Feb 2, 2023  Office visit    Dermatology Problem List:    Lenard/Palomo CC    # Psoriasis (~60% BSA initially; now <10-15%)  - bx 12/30/19 sebopsoriasis  - Pending tx: Secukinumab (script 2/2/23)  - Current Tx: baricitinib 4mg daily - discontinue on start of above  - Previous Tx: Enbrel 50mg (effective for psoriasis. No effective for AA), clobetasol ointment, ILK10 arms 6/17/21, triam 0.1% oint, Humira (ineffective)  # Alopecia areata  - bx 12/30/19  - s/p numerous ILK 10 10/22/20 (2cc), 6/17/21 (2cc), 11/18/21 (6cc ILK10, 1cc ILK5), 1/27/22 (3cc ILK10) without much relief  - Current Tx: baricitinib 4mg daily  - prior: doxy (GI side effects), triam 0.1 oint, squaric acid  # Hx vitamin D deficiency  - vitamin D 12 10/2020, s/p 50K international unit(s) x 6w (10/2020)  # Bacterial superinfection on scalp  - current: BPO wash  - s/p Keflex 500mg BID  ___________________________________________    Assessment & Plan:    # Psoriasis  # Alopecia areata  Condition has worsened despite dose escalation. Will transition to secukinumab for psoriasis. Suspect baricitinib non-responder for AA. If ineffective control with secukinumab, could add methotrexate which may have some effect for AA  - Continue baricitinib 4 mg daily - discontinue on start of Secukinumab   - Secukinumab loading and maintenance  - Labs today: CBC w/ diff, CMP, lipids  - Reviewed quant, hepatitis panel (baseline for baricitinib)  - Continue topicals as needed     Procedures Performed:  None    Follow-up: 2m    Staff Involved:  Patient was seen and staffed with attending physician Dr. Ryan Weinberg MD  Internal Medicine - Dermatology PGY5    Patient was seen and examined with the medicine/dermatology resident. I agree with the history, review of systems, physical examination, assessments and plan.    Dorothy Davis MD  Professor and  Chair  Department of Dermatology  [facility]    ___________________________________________      CC: Derm Problem (Sulaiman is here today for a recheck and states there has been no change )      HPI:  Mr. Sulaiman Mccray is a(n) 22 year old male who presents to clinic today for psoriasis and alopecia areata follow up after escalating baricitinib. Presents with mom. Korean iPad  was used. Reports:  - despite escalation worsened pso and no improvement in AA  - pso is most bothersome and wants improved  - no side effects with baricitinib  - ok with labs today    Physical exam:  General: in no acute distress, well-developed, well-nourished  Skin:  - skin type: light brown  - complete hair loss affecting scalp, eyebrows, and eyelashes. Follicular ostia still present  - well-defined erythematous plaques with overlying scale in the scalp, back, arms, and flanks (increased from prior)  - No other lesions of concern on areas examined.     Medications:  Current Outpatient Medications   Medication     Baricitinib 4 MG TABS     clobetasol (TEMOVATE) 0.05 % external ointment     omeprazole (PRILOSEC) 20 MG DR capsule     Secukinumab, 300 MG Dose, 150 MG/ML SOAJ     Secukinumab, 300 MG Dose, 150 MG/ML SOAJ     benzoyl peroxide (PANOXYL) 10 % external liquid     chlorhexidine (HIBICLENS) 4 % liquid     cholecalciferol 25 MCG (1000 UT) TABS     etanercept (ENBREL SURECLICK) 50 MG/ML autoinjector     fexofenadine (ALLEGRA) 180 MG tablet     triamcinolone (KENALOG) 0.1 % external ointment     Current Facility-Administered Medications   Medication     triamcinolone acetonide (KENALOG-10) injection 10 mg     triamcinolone acetonide (KENALOG-10) injection 20 mg     triamcinolone acetonide (KENALOG-10) injection 20 mg     triamcinolone acetonide (KENALOG-10) injection 30 mg     triamcinolone acetonide (KENALOG-10) injection 30 mg     triamcinolone acetonide (KENALOG-10) injection 40 mg     triamcinolone acetonide (KENALOG-10) injection 40 mg      Past  Medical History:   Patient Active Problem List   Diagnosis     ADHD (attention deficit hyperactivity disorder)     Alopecia areata     Anxiety     Major depressive disorder, single episode, mild (H)     Suicidal ideation     No past medical history on file.    CC Referred Self, MD  No address on file on close of this encounter.

## 2023-02-02 NOTE — TELEPHONE ENCOUNTER
PA Initiation    Medication: Cosentyx  Insurance Company: [employer] - Phone [phone] Fax [phone]  Pharmacy Filling the Rx: [facility] - [place], [place] - [facility] [address]  Filling Pharmacy Phone:    Filling Pharmacy Fax:    Start Date: 2/2/2023    Key: [id number]

## 2023-02-02 NOTE — NURSING NOTE
Dermatology Rooming Note    Sulaiman Mccray's goals for this visit include:   Chief Complaint   Patient presents with     Derm Problem     Sulaiman is here today for a recheck and states there has been no change      Carlito BRIONES CMA

## 2023-02-02 NOTE — LETTER
2/2/2023       RE: Sulaiman Mccray  [address] [place] [address]     Dear Colleague,    Thank you for referring your patient, Sulaiman Mccray, to the [facility] DERMATOLOG[facility] [place] at [facility]. Please see a copy of my visit note below.    [facility] Dermatology Note   Encounter Date: Feb 2, 2023  Office visit    Dermatology Problem List:    Lenard/Palomo CC    # Psoriasis (~60% BSA initially; now <10-15%)  - bx 12/30/19 sebopsoriasis  - Pending tx: Secukinumab (script 2/2/23)  - Current Tx: baricitinib 4mg daily - discontinue on start of above  - Previous Tx: Enbrel 50mg (effective for psoriasis. No effective for AA), clobetasol ointment, ILK10 arms 6/17/21, triam 0.1% oint, Humira (ineffective)  # Alopecia areata  - bx 12/30/19  - s/p numerous ILK 10 10/22/20 (2cc), 6/17/21 (2cc), 11/18/21 (6cc ILK10, 1cc ILK5), 1/27/22 (3cc ILK10) without much relief  - Current Tx: baricitinib 4mg daily  - prior: doxy (GI side effects), triam 0.1 oint, squaric acid  # Hx vitamin D deficiency  - vitamin D 12 10/2020, s/p 50K international unit(s) x 6w (10/2020)  # Bacterial superinfection on scalp  - current: BPO wash  - s/p Keflex 500mg BID  ___________________________________________    Assessment & Plan:    # Psoriasis  # Alopecia areata  Condition has worsened despite dose escalation. Will transition to secukinumab for psoriasis. Suspect baricitinib non-responder for AA. If ineffective control with secukinumab, could add methotrexate which may have some effect for AA  - Continue baricitinib 4 mg daily - discontinue on start of Secukinumab   - Secukinumab loading and maintenance  - Labs today: CBC w/ diff, CMP, lipids  - Reviewed quant, hepatitis panel (baseline for baricitinib)  - Continue topicals as needed     Procedures Performed:  None    Follow-up: 2m    Staff Involved:  Patient was seen and staffed with attending  physician Dr. Ryan Weinberg MD  Internal Medicine - Dermatology PGY5    ___________________________________________      CC: Derm Problem (Sulaiman is here today for a recheck and states there has been no change )      HPI:  Mr. Sulaiman Mccray is a(n) 22 year old male who presents to clinic today for psoriasis and alopecia areata follow up after escalating baricitinib. Presents with mom. Greek iPad  was used. Reports:  - despite escalation worsened pso and no improvement in AA  - pso is most bothersome and wants improved  - no side effects with baricitinib  - ok with labs today    Physical exam:  General: in no acute distress, well-developed, well-nourished  Skin:  - skin type: light brown  - complete hair loss affecting scalp, eyebrows, and eyelashes. Follicular ostia still present  - well-defined erythematous plaques with overlying scale in the scalp, back, arms, and flanks (increased from prior)  - No other lesions of concern on areas examined.     Medications:  Current Outpatient Medications   Medication     Baricitinib 4 MG TABS     clobetasol (TEMOVATE) 0.05 % external ointment     omeprazole (PRILOSEC) 20 MG DR capsule     Secukinumab, 300 MG Dose, 150 MG/ML SOAJ     Secukinumab, 300 MG Dose, 150 MG/ML SOAJ     benzoyl peroxide (PANOXYL) 10 % external liquid     chlorhexidine (HIBICLENS) 4 % liquid     cholecalciferol 25 MCG (1000 UT) TABS     etanercept (ENBREL SURECLICK) 50 MG/ML autoinjector     fexofenadine (ALLEGRA) 180 MG tablet     triamcinolone (KENALOG) 0.1 % external ointment     Current Facility-Administered Medications   Medication     triamcinolone acetonide (KENALOG-10) injection 10 mg     triamcinolone acetonide (KENALOG-10) injection 20 mg     triamcinolone acetonide (KENALOG-10) injection 20 mg     triamcinolone acetonide (KENALOG-10) injection 30 mg     triamcinolone acetonide (KENALOG-10) injection 30 mg     triamcinolone acetonide (KENALOG-10) injection 40  mg     triamcinolone acetonide (KENALOG-10) injection 40 mg      Past Medical History:   Patient Active Problem List   Diagnosis     ADHD (attention deficit hyperactivity disorder)     Alopecia areata     Anxiety     Major depressive disorder, single episode, mild (H)     Suicidal ideation     No past medical history on file.    CC Referred Self, MD  No address on file on close of this encounter.      Again, thank you for allowing me to participate in the care of your patient.      Sincerely,    Venancio Weinberg

## 2023-02-02 NOTE — LETTER
Date:February 10, 2023      Provider requested that no letter be sent. Do not send.       [facility]

## 2023-02-03 LAB
GAMMA INTERFERON BACKGROUND BLD IA-ACNC: 0.01 IU/ML
M TB IFN-G BLD-IMP: NEGATIVE
M TB IFN-G CD4+ BCKGRND COR BLD-ACNC: 9.99 IU/ML
MITOGEN IGNF BCKGRD COR BLD-ACNC: 0.04 IU/ML
MITOGEN IGNF BCKGRD COR BLD-ACNC: 0.04 IU/ML
QUANTIFERON MITOGEN: 10 IU/ML
QUANTIFERON NIL TUBE: 0.01 IU/ML
QUANTIFERON TB1 TUBE: 0.05 IU/ML
QUANTIFERON TB2 TUBE: 0.05

## 2023-02-03 NOTE — TELEPHONE ENCOUNTER
Prior Authorization Approval    Authorization Effective Date: 1/3/2023  Authorization Expiration Date: 2/2/2024  Medication: Cosentyx  Approved Dose/Quantity: 10ml for 35 days  Reference #: Key: [id number]   Insurance Company: [employer] - Phone [phone] Fax [phone]  Expected CoPay: $0.00     CoPay Card Available: No    Foundation Assistance Needed:    Which Pharmacy is filling the prescription (Not needed for infusion/clinic administered): [facility] - [place], [place] - [address]  Pharmacy Notified: Yes  Patient Notified: Yes

## 2023-03-07 ENCOUNTER — APPOINTMENT (OUTPATIENT)
Dept: INTERPRETER SERVICES | Facility: CLINIC | Age: 23
End: 2023-03-07
Payer: COMMERCIAL

## 2023-03-13 ENCOUNTER — APPOINTMENT (OUTPATIENT)
Dept: INTERPRETER SERVICES | Facility: CLINIC | Age: 23
End: 2023-03-13
Payer: COMMERCIAL

## 2023-04-20 ENCOUNTER — APPOINTMENT (OUTPATIENT)
Dept: INTERPRETER SERVICES | Facility: CLINIC | Age: 23
End: 2023-04-20
Payer: COMMERCIAL

## 2023-05-04 ENCOUNTER — OFFICE VISIT (OUTPATIENT)
Dept: DERMATOLOGY | Facility: CLINIC | Age: 23
End: 2023-05-04
Payer: COMMERCIAL

## 2023-05-04 ENCOUNTER — TELEPHONE (OUTPATIENT)
Dept: DERMATOLOGY | Facility: CLINIC | Age: 23
End: 2023-05-04

## 2023-05-04 DIAGNOSIS — L63.9 ALOPECIA AREATA: ICD-10-CM

## 2023-05-04 DIAGNOSIS — L40.9 PSORIASIS: Primary | ICD-10-CM

## 2023-05-04 DIAGNOSIS — R79.89 LOW VITAMIN D LEVEL: ICD-10-CM

## 2023-05-04 PROCEDURE — 99214 OFFICE O/P EST MOD 30 MIN: CPT | Mod: GC | Performed by: DERMATOLOGY

## 2023-05-04 RX ORDER — ERGOCALCIFEROL 1.25 MG/1
50000 CAPSULE, LIQUID FILLED ORAL WEEKLY
Qty: 8 CAPSULE | Refills: 0 | Status: SHIPPED | OUTPATIENT
Start: 2023-05-04 | End: 2024-09-06

## 2023-05-04 ASSESSMENT — PAIN SCALES - GENERAL: PAINLEVEL: NO PAIN (0)

## 2023-05-04 NOTE — PROGRESS NOTES
[facility] Dermatology Note   Encounter Date: May 4, 2023  Office visit    Dermatology Problem List:    Lenard/Palomo CC     # Psoriasis (~60% BSA initially; now ~20-30%)  - bx 12/30/19 sebopsoriasis  - Current Tx: Secukinumab   - Started 2/2/23   - Safety labs: 2/2023 (wnl)  - Previous Tx: Enbrel 50mg (effective for psoriasis. No effective for AA), baricitinib (not effective), clobetasol ointment, ILK10 arms 6/17/21, triam 0.1% oint, Humira (ineffective)  # Alopecia areata  - bx 12/30/19  - s/p numerous ILK 10 10/22/20 (2cc), 6/17/21 (2cc), 11/18/21 (6cc ILK10, 1cc ILK5), 1/27/22 (3cc ILK10) without much relief  - Current Tx: baricitinib 4mg daily  - prior: doxy (GI side effects), triam 0.1 oint, squaric acid  # Hx vitamin D deficiency  - vitamin D 12 2/2023, s/p 50K international unit(s) x 6w (10/2020)  - vitamin D 12 10/2020, s/p 50K international unit(s) x 6w (10/2020)  # Bacterial superinfection on scalp  - current: BPO wash  - s/p Keflex 500mg BID    ___________________________________________    Assessment & Plan:    # Psoriasis  # Alopecia areata  Condition has improved since last office visit. Will continue with current regimen and reassess in 3-4m. Will also provide replacement of vit D  - Continue with Cosyntex   - Refilled today  - Prescribed high dose Vit D 50k units weekly x8w  - Continue with triamcinolone 0.1% ointment BID PRN     Procedures Performed:  None    Follow-up: 3-4m    Staff Involved:  Patient was seen and staffed with attending physician Dr. Mayur Culver MD  Med/Derm Resident PGY-5  P:[phone]    Staff Addendum:  I have personally examined this patient and agree with the resident doctor's documentation and plan of care. I have reviewed and amended the resident's note above. The documentation accurately reflects my clinical observations, diagnoses, treatment and follow-up plans.     Jena Merchant MD  Dermatology  Staff    ___________________________________________      CC: Derm Problem (Sulaiman states he has been stable and does not have any concerns)    HPI:  Mr. Sulaiman Mccray is a(n) 23 year old male who presents to clinic today for psoriasis and alopecia areata follow up. Reports:  - condition has begun improving  - still has some active/recalcitrant areas of psoriasis on LUE  - no issues with cosyntex  - no changes in bowel habits, hematochezia, or abdominal pain  - otherwise feeling well in usual state of health    Physical exam:  General: in no acute distress, well-developed, well-nourished  Skin:  - skin type: light brown  - well-defined red plaques with overlying micaceous scale on arms (L >>> R), abdomen, and scalp  - hyperpigmented patches on previously inflamed skin  - No other lesions of concern on areas examined.     Medications:  Current Outpatient Medications   Medication     Baricitinib 4 MG TABS     clobetasol (TEMOVATE) 0.05 % external ointment     omeprazole (PRILOSEC) 20 MG DR capsule     Secukinumab, 300 MG Dose, 150 MG/ML SOAJ     vitamin D2 (ERGOCALCIFEROL) 51393 units (1250 mcg) capsule     triamcinolone (KENALOG) 0.1 % external ointment     Current Facility-Administered Medications   Medication     triamcinolone acetonide (KENALOG-10) injection 10 mg     triamcinolone acetonide (KENALOG-10) injection 20 mg     triamcinolone acetonide (KENALOG-10) injection 20 mg     triamcinolone acetonide (KENALOG-10) injection 30 mg     triamcinolone acetonide (KENALOG-10) injection 30 mg     triamcinolone acetonide (KENALOG-10) injection 40 mg     triamcinolone acetonide (KENALOG-10) injection 40 mg      Past Medical History:   Patient Active Problem List   Diagnosis     ADHD (attention deficit hyperactivity disorder)     Alopecia areata     Anxiety     Major depressive disorder, single episode, mild (H)     Suicidal ideation     No past medical history on file.    CC Referred Self, MD  No address on file  on close of this encounter.

## 2023-05-04 NOTE — TELEPHONE ENCOUNTER
Unable to Lvm. 1st attempt  informing patient to call [phone] to schedule 3-4m follow up with any Provider  in Dermatology.

## 2023-05-04 NOTE — LETTER
5/4/2023       RE: Sulaiman Mccray  [address] [address]     Dear Colleague,    Thank you for referring your patient, Sulaiman Mccray, to the [facility] DERMATOLOGY CLINIC [place] at [facility]. Please see a copy of my visit note below.    [facility] Dermatology Note   Encounter Date: May 4, 2023  Office visit    Dermatology Problem List:    Lenard/Palomo CC     # Psoriasis (~60% BSA initially; now ~20-30%)  - bx 12/30/19 sebopsoriasis  - Current Tx: Secukinumab   - Started 2/2/23   - Safety labs: 2/2023 (wnl)  - Previous Tx: Enbrel 50mg (effective for psoriasis. No effective for AA), baricitinib (not effective), clobetasol ointment, ILK10 arms 6/17/21, triam 0.1% oint, Humira (ineffective)  # Alopecia areata  - bx 12/30/19  - s/p numerous ILK 10 10/22/20 (2cc), 6/17/21 (2cc), 11/18/21 (6cc ILK10, 1cc ILK5), 1/27/22 (3cc ILK10) without much relief  - Current Tx: baricitinib 4mg daily  - prior: doxy (GI side effects), triam 0.1 oint, squaric acid  # Hx vitamin D deficiency  - vitamin D 12 2/2023, s/p 50K international unit(s) x 6w (10/2020)  - vitamin D 12 10/2020, s/p 50K international unit(s) x 6w (10/2020)  # Bacterial superinfection on scalp  - current: BPO wash  - s/p Keflex 500mg BID    ___________________________________________    Assessment & Plan:    # Psoriasis  # Alopecia areata  Condition has improved since last office visit. Will continue with current regimen and reassess in 3-4m. Will also provide replacement of vit D  - Continue with Cosyntex   - Refilled today  - Prescribed high dose Vit D 50k units weekly x8w  - Continue with triamcinolone 0.1% ointment BID PRN     Procedures Performed:  None    Follow-up: 3-4m    Staff Involved:  Patient was seen and staffed with attending physician Dr. Mayur Culver MD  Med/Derm Resident PGY-5  P:[phone]    Staff Addendum:  I have  personally examined this patient and agree with the resident doctor's documentation and plan of care. I have reviewed and amended the resident's note above. The documentation accurately reflects my clinical observations, diagnoses, treatment and follow-up plans.     Jena Merchant MD  Dermatology Staff    ___________________________________________      CC: Derm Problem (Sulaiman states he has been stable and does not have any concerns)    HPI:  Mr. Sulaiman Mccray is a(n) 23 year old male who presents to clinic today for psoriasis and alopecia areata follow up. Reports:  - condition has begun improving  - still has some active/recalcitrant areas of psoriasis on LUE  - no issues with cosyntex  - no changes in bowel habits, hematochezia, or abdominal pain  - otherwise feeling well in usual state of health    Physical exam:  General: in no acute distress, well-developed, well-nourished  Skin:  - skin type: light brown  - well-defined red plaques with overlying micaceous scale on arms (L >>> R), abdomen, and scalp  - hyperpigmented patches on previously inflamed skin  - No other lesions of concern on areas examined.     Medications:  Current Outpatient Medications   Medication    Baricitinib 4 MG TABS    clobetasol (TEMOVATE) 0.05 % external ointment    omeprazole (PRILOSEC) 20 MG DR capsule    Secukinumab, 300 MG Dose, 150 MG/ML SOAJ    vitamin D2 (ERGOCALCIFEROL) 04626 units (1250 mcg) capsule    triamcinolone (KENALOG) 0.1 % external ointment     Current Facility-Administered Medications   Medication    triamcinolone acetonide (KENALOG-10) injection 10 mg    triamcinolone acetonide (KENALOG-10) injection 20 mg    triamcinolone acetonide (KENALOG-10) injection 20 mg    triamcinolone acetonide (KENALOG-10) injection 30 mg    triamcinolone acetonide (KENALOG-10) injection 30 mg    triamcinolone acetonide (KENALOG-10) injection 40 mg    triamcinolone acetonide (KENALOG-10) injection 40 mg      Past Medical  History:   Patient Active Problem List   Diagnosis    ADHD (attention deficit hyperactivity disorder)    Alopecia areata    Anxiety    Major depressive disorder, single episode, mild (H)    Suicidal ideation     No past medical history on file.    CC Referred Self, MD  No address on file on close of this encounter.

## 2023-05-07 ENCOUNTER — HEALTH MAINTENANCE LETTER (OUTPATIENT)
Age: 23
End: 2023-05-07

## 2023-05-25 ENCOUNTER — TELEPHONE (OUTPATIENT)
Dept: DERMATOLOGY | Facility: CLINIC | Age: 23
End: 2023-05-25
Payer: COMMERCIAL

## 2023-05-25 NOTE — TELEPHONE ENCOUNTER
Unable to LVM (VM box not set up) 1st attempt to reach patient to schedule 3-4m f/up with any Provider in Dermatology.

## 2023-05-31 ENCOUNTER — TELEPHONE (OUTPATIENT)
Dept: DERMATOLOGY | Facility: CLINIC | Age: 23
End: 2023-05-31
Payer: COMMERCIAL

## 2023-05-31 NOTE — TELEPHONE ENCOUNTER
Jose Mm, my chart msg for patient to scheduled the following     Appointment type: Return  Provider:   Return date: August 2023  Specialty phone number: [phone]

## 2023-06-27 DIAGNOSIS — R79.89 LOW VITAMIN D LEVEL: ICD-10-CM

## 2023-06-29 NOTE — TELEPHONE ENCOUNTER
VIT D2 (ERGOCALCIFEROL) 50,000U   Last Written Prescription Date:  5/4/23  Last Fill Quantity: 80,   # refills: 0  Last Office Visit : 5/4/23  Future Office visit:  NONE  Routing refill request to provider for review/approval because:  Drug not on the refill protocol

## 2023-06-30 RX ORDER — ERGOCALCIFEROL 1.25 MG/1
CAPSULE, LIQUID FILLED ORAL
Qty: 8 CAPSULE | Refills: 0 | OUTPATIENT
Start: 2023-06-30

## 2024-01-24 NOTE — PATIENT INSTRUCTIONS
1. Take keflex 500 mg twice a day with breakfast and dinner for 10 days. You may have some diarrhea/upset stomach with this medication. Yogurt, rice, apples, bananas can help with the diarrhea.   2. Get labs done today.   3. Apply clobetasol 0.05% ointment thin layer to the scalp once daily for 2 weeks until follow up appointment. Stop using triamcinolone 0.1% ointment.   4. Continue using Head & Shoulders shampoo.    Opt out

## 2024-01-26 ENCOUNTER — TELEPHONE (OUTPATIENT)
Dept: DERMATOLOGY | Facility: CLINIC | Age: 24
End: 2024-01-26
Payer: COMMERCIAL

## 2024-01-26 NOTE — TELEPHONE ENCOUNTER
PA Initiation    Medication: COSENTYX SENSOREADY (300 MG) 150 MG/ML SC SOAJ  Insurance Company: [facility] - Phone [phone] Fax [phone]  Pharmacy Filling the Rx: [facility] - [place], [place] - [facility] [address]  Filling Pharmacy Phone:    Filling Pharmacy Fax:    Start Date: 1/26/2024      Key: [id number]

## 2024-01-29 NOTE — TELEPHONE ENCOUNTER
Prior Authorization Approval    Medication: COSENTYX SENSOREADY (300 MG) 150 MG/ML SC SOAJ  Authorization Effective Date: 1/26/2024  Authorization Expiration Date: 1/26/2025  Approved Dose/Quantity: 2 for 28 days  Reference #: Key: [id number]   Insurance Company: [employer] - Phone [phone] Fax [phone]  Expected CoPay: $    CoPay Card Available: No    Financial Assistance Needed: no  Which Pharmacy is filling the prescription: [facility] - [place], [place] - [facility] [address]   Pharmacy Notified: yes  Patient Notified:

## 2024-05-07 ENCOUNTER — TELEPHONE (OUTPATIENT)
Dept: DERMATOLOGY | Facility: CLINIC | Age: 24
End: 2024-05-07
Payer: COMMERCIAL

## 2024-05-07 ENCOUNTER — APPOINTMENT (OUTPATIENT)
Dept: INTERPRETER SERVICES | Facility: CLINIC | Age: 24
End: 2024-05-07
Payer: COMMERCIAL

## 2024-05-07 ENCOUNTER — VIRTUAL VISIT (OUTPATIENT)
Dept: INTERPRETER SERVICES | Facility: CLINIC | Age: 24
End: 2024-05-07
Payer: COMMERCIAL

## 2024-05-07 DIAGNOSIS — L40.9 PSORIASIS: ICD-10-CM

## 2024-05-07 NOTE — TELEPHONE ENCOUNTER
[facility] Call Center    Phone Message    May a detailed message be left on voicemail: yes     Reason for Call: Other: Sulaiman advises they are regularly seen every few months for the hairloss appointment, the February appointment is scheduled as a new hair loss as they have not seen Dr Gonzalez since 2020 (according to their chart, it looks like they may have seen other providers though (Marcin Culver) for alopecia, please advise Sulaiman if they can be seen sooner than 1st available & wait list.     Action Taken: Message routed to:  [facility] Derm    Travel Screening: Not Applicable

## 2024-05-07 NOTE — TELEPHONE ENCOUNTER
Contacted patient via Home Phone number. Of note, his preferred phone number is his mother's. There is no consent to communicate on file, so I explained to mother that I could not discuss PHI with him.    Spoke with patient via  and scheduled him for 7/19/24 at 9:30 am. He was given the details and had no questions or concerns.    Sol Rose LPN

## 2024-05-07 NOTE — TELEPHONE ENCOUNTER
Writer received a refill request for this patient's Secukinumab, 300 MG Dose, 150 MG/ML SOAJ. Will forward to the refill team for review.     Pavithra Hale LPN

## 2024-07-03 ENCOUNTER — APPOINTMENT (OUTPATIENT)
Dept: INTERPRETER SERVICES | Facility: CLINIC | Age: 24
End: 2024-07-03
Payer: COMMERCIAL

## 2024-07-14 ENCOUNTER — HEALTH MAINTENANCE LETTER (OUTPATIENT)
Age: 24
End: 2024-07-14

## 2024-07-19 ENCOUNTER — LAB (OUTPATIENT)
Dept: LAB | Facility: CLINIC | Age: 24
End: 2024-07-19
Payer: COMMERCIAL

## 2024-07-19 ENCOUNTER — OFFICE VISIT (OUTPATIENT)
Dept: DERMATOLOGY | Facility: CLINIC | Age: 24
End: 2024-07-19
Payer: COMMERCIAL

## 2024-07-19 DIAGNOSIS — L63.9 ALOPECIA AREATA: Primary | ICD-10-CM

## 2024-07-19 DIAGNOSIS — L40.9 PSORIASIS: ICD-10-CM

## 2024-07-19 DIAGNOSIS — Z51.81 MEDICATION MONITORING ENCOUNTER: ICD-10-CM

## 2024-07-19 DIAGNOSIS — R79.89 LOW VITAMIN D LEVEL: ICD-10-CM

## 2024-07-19 DIAGNOSIS — L63.9 ALOPECIA AREATA: ICD-10-CM

## 2024-07-19 LAB
ALBUMIN SERPL BCG-MCNC: 4.4 G/DL (ref 3.5–5.2)
ALP SERPL-CCNC: 79 U/L (ref 40–150)
ALT SERPL W P-5'-P-CCNC: 71 U/L (ref 0–70)
ANION GAP SERPL CALCULATED.3IONS-SCNC: 11 MMOL/L (ref 7–15)
AST SERPL W P-5'-P-CCNC: 40 U/L (ref 0–45)
BASOPHILS # BLD AUTO: 0.1 10E3/UL (ref 0–0.2)
BASOPHILS NFR BLD AUTO: 1 %
BILIRUB SERPL-MCNC: 0.5 MG/DL
BUN SERPL-MCNC: 11.7 MG/DL (ref 6–20)
CALCIUM SERPL-MCNC: 9.6 MG/DL (ref 8.8–10.4)
CHLORIDE SERPL-SCNC: 105 MMOL/L (ref 98–107)
CHOLEST SERPL-MCNC: 113 MG/DL
CREAT SERPL-MCNC: 0.83 MG/DL (ref 0.67–1.17)
EGFRCR SERPLBLD CKD-EPI 2021: >90 ML/MIN/1.73M2
EOSINOPHIL # BLD AUTO: 0.9 10E3/UL (ref 0–0.7)
EOSINOPHIL NFR BLD AUTO: 11 %
ERYTHROCYTE [DISTWIDTH] IN BLOOD BY AUTOMATED COUNT: 12.7 % (ref 10–15)
FASTING STATUS PATIENT QL REPORTED: NO
FASTING STATUS PATIENT QL REPORTED: NO
GLUCOSE SERPL-MCNC: 98 MG/DL (ref 70–99)
HCO3 SERPL-SCNC: 24 MMOL/L (ref 22–29)
HCT VFR BLD AUTO: 47 % (ref 40–53)
HDLC SERPL-MCNC: 43 MG/DL
HGB BLD-MCNC: 16.1 G/DL (ref 13.3–17.7)
IMM GRANULOCYTES # BLD: 0 10E3/UL
IMM GRANULOCYTES NFR BLD: 0 %
LDLC SERPL CALC-MCNC: 53 MG/DL
LYMPHOCYTES # BLD AUTO: 2.4 10E3/UL (ref 0.8–5.3)
LYMPHOCYTES NFR BLD AUTO: 32 %
MCH RBC QN AUTO: 29.3 PG (ref 26.5–33)
MCHC RBC AUTO-ENTMCNC: 34.3 G/DL (ref 31.5–36.5)
MCV RBC AUTO: 86 FL (ref 78–100)
MONOCYTES # BLD AUTO: 0.6 10E3/UL (ref 0–1.3)
MONOCYTES NFR BLD AUTO: 9 %
NEUTROPHILS # BLD AUTO: 3.6 10E3/UL (ref 1.6–8.3)
NEUTROPHILS NFR BLD AUTO: 47 %
NONHDLC SERPL-MCNC: 70 MG/DL
NRBC # BLD AUTO: 0 10E3/UL
NRBC BLD AUTO-RTO: 0 /100
PLATELET # BLD AUTO: 267 10E3/UL (ref 150–450)
POTASSIUM SERPL-SCNC: 4.1 MMOL/L (ref 3.4–5.3)
PROT SERPL-MCNC: 7.8 G/DL (ref 6.4–8.3)
RBC # BLD AUTO: 5.49 10E6/UL (ref 4.4–5.9)
SODIUM SERPL-SCNC: 140 MMOL/L (ref 135–145)
TRIGL SERPL-MCNC: 84 MG/DL
VIT D+METAB SERPL-MCNC: 14 NG/ML (ref 20–50)
WBC # BLD AUTO: 7.6 10E3/UL (ref 4–11)

## 2024-07-19 PROCEDURE — 80053 COMPREHEN METABOLIC PANEL: CPT | Performed by: PATHOLOGY

## 2024-07-19 PROCEDURE — 36415 COLL VENOUS BLD VENIPUNCTURE: CPT | Performed by: PATHOLOGY

## 2024-07-19 PROCEDURE — 80061 LIPID PANEL: CPT | Performed by: PATHOLOGY

## 2024-07-19 PROCEDURE — 82306 VITAMIN D 25 HYDROXY: CPT | Performed by: DERMATOLOGY

## 2024-07-19 PROCEDURE — 99000 SPECIMEN HANDLING OFFICE-LAB: CPT | Performed by: PATHOLOGY

## 2024-07-19 PROCEDURE — 85025 COMPLETE CBC W/AUTO DIFF WBC: CPT | Performed by: PATHOLOGY

## 2024-07-19 PROCEDURE — 86481 TB AG RESPONSE T-CELL SUSP: CPT | Performed by: DERMATOLOGY

## 2024-07-19 PROCEDURE — 99214 OFFICE O/P EST MOD 30 MIN: CPT | Mod: GC | Performed by: DERMATOLOGY

## 2024-07-19 RX ORDER — CLOBETASOL PROPIONATE 0.5 MG/G
OINTMENT TOPICAL 2 TIMES DAILY
Qty: 45 G | Refills: 2 | Status: SHIPPED | OUTPATIENT
Start: 2024-07-19

## 2024-07-19 NOTE — LETTER
7/19/2024       RE: Sulaiman Mccray  [address] [address]     Dear Colleague,    Thank you for referring your patient, Sulaiman Mccray, to the [facility] [place] at [facility]. Please see a copy of my visit note below.    [facility] Dermatology Note  Encounter Date: Jul 19, 2024  Office Visit     Dermatology Problem List:  # Psoriasis (~60% BSA initially; now ~20-30%)  - bx 12/30/19 sebopsoriasis  - Current Tx: Secukinumab              - Started 2/2/23              - Safety labs: 2/2023 (wnl)  - Previous Tx: Enbrel 50mg (effective for psoriasis. No effective for AA), baricitinib (not effective), clobetasol ointment, ILK10 arms 6/17/21, triam 0.1% oint, Humira (ineffective)  # Alopecia areata  - bx 12/30/19  - s/p numerous ILK 10 10/22/20 (2cc), 6/17/21 (2cc), 11/18/21 (6cc ILK10, 1cc ILK5), 1/27/22 (3cc ILK10) without much relief  - Current Tx:   - prior: doxy (GI side effects), triam 0.1 oint, squaric acid, baricitinib 4mg daily  # Hx vitamin D deficiency  - vitamin D 12 2/2023, s/p 50K international unit(s) x 6w (10/2020)  - vitamin D 12 10/2020, s/p 50K international unit(s) x 6w (10/2020)  # Bacterial superinfection on scalp  - current: BPO wash  - s/p Keflex 500mg BID     ____________________________________________    Assessment & Plan:     # Alopecia universalis, with some patches of hair regrowth on the scalp, still with absence of hair on the eyebrows and eyelashes, overall improving but not at goal  - Discussed oral therapies and reviewed holding off on dual immunomodulator therapies at this time  - Start clobetasol ointment BID to scalp   - Obtain vitamin D level today given replacement completed     # Psoriasis, involving the scalp   - Continue Cosentyx for psoriasis   - Okay to apply clobetasol to arm involvement as well  - Reviewed risk of skin thinning and sparing use   -  Monitoring labs completed today     Procedures Performed:   None    Follow-up: 2 month(s) in-person, or earlier for new or changing lesions    Staff and Resident:  Patient seen and staffed with Dr. Davis.     Oxana Junior MD  Dermatology Resident PGY-2    Patient was seen and examined with the dermatology resident. I agree with the history, review of systems, physical examination, assessments and plan.  Dorothy Davis MD  Professor   Department of Dermatology  [facility]     ____________________________________________    CC: No chief complaint on file.    HPI:  Mr. Sulaiman Mccray is a 24 year old male who presents as a return patient for follow-up of hair loss, diagnosed as alopecia areata which is associated with psoriasis  - Last seen in-clinic on 5/2023  - Shedding or thinning, or both: None   - Current tx: None, just Cosentyx for psoriasis 300 mg monthly (stopped the topicals and baricitinib, unsure when he stopped or if these were helping)  - If using Rogaine, 1 cannister lasts how long: N/A   - Scalp or hair care habits/products: OTC shampoo   No Any new medications, supplements, or products? (please list below)     No Scalp pain   No Scalp burning   No Scalp itching    Some growth Eyebrow changes    Some growth Eyelash changes   No Beard changes    No Other body hair changes    No Nail changes    No Additional symptoms? (please list below)     - Overall course: Stable to improved   - COVID status: No recent history     Patient is otherwise feeling well, in usual state of health, and has no additional skin concerns today.     Labs:  Quant gold, CBC , CMP , and Vitamin D reviewed with low vitamin D and elevated LFTs.     Physical Exam:  GEN: Well developed, well-nourished, in no acute distress, in a pleasant mood.    SKIN: Focused examination of scalp, face, and nails was performed.  - Arceo type:III  - Patches of hair regrowth scattered along the scalp that mostly  coincide over pink scaly patches   - No diffuse erythema   - No loose scaling of the scalp   - Decreased eyelash density  -  Decreased eyebrow density  - Nails painted   - No scalp folliculitis/pustules   - In comparison to prior photographs, improved   - Well circumscribed pink scaly plaques to the bilateral arms   - No other lesions of concern on areas examined.   - Fine vellus and indeterminate  hairs on the eyebrows , none on eyelashes and scalp     Medications:  Current Outpatient Medications   Medication Sig Dispense Refill     Baricitinib 4 MG TABS Take 4 mg by mouth daily 30 tablet 3     clobetasol (TEMOVATE) 0.05 % external ointment Apply topically 2 times daily 45 g 3     omeprazole (PRILOSEC) 20 MG DR capsule Take 1 capsule (20 mg) by mouth daily 90 capsule 1     Secukinumab, 300 MG Dose, 150 MG/ML SOAJ Inject 300 mg Subcutaneous every 28 days 2 mL 3     triamcinolone (KENALOG) 0.1 % external ointment Apply topically 2 times daily (Patient not taking: Reported on 3/24/2022) 454 g 3     vitamin D2 (ERGOCALCIFEROL) 16241 units (1250 mcg) capsule Take 1 capsule (50,000 Units) by mouth once a week 8 capsule 0     Current Facility-Administered Medications   Medication Dose Route Frequency Provider Last Rate Last Admin     triamcinolone acetonide (KENALOG-10) injection 10 mg  10 mg Intra-Lesional Once Jena Merchant MD         triamcinolone acetonide (KENALOG-10) injection 20 mg  20 mg Intra-Lesional Once Dorothy Davis MD         triamcinolone acetonide (KENALOG-10) injection 20 mg  20 mg Intra-Lesional Once Dorothy Davis MD         triamcinolone acetonide (KENALOG-10) injection 30 mg  30 mg Intra-Lesional Once Trent Zavala MD         triamcinolone acetonide (KENALOG-10) injection 30 mg  30 mg Intra-Lesional Once Dorothy Davis MD         triamcinolone acetonide (KENALOG-10) injection 40 mg  40 mg Intra-Lesional Once Kristal Floyd MD          triamcinolone acetonide (KENALOG-10) injection 40 mg  40 mg Intra-Lesional Once Dorothy Davis MD          Past Medical History:   Patient Active Problem List   Diagnosis     ADHD (attention deficit hyperactivity disorder)     Alopecia areata     Anxiety     Major depressive disorder, single episode, mild (H24)     Suicidal ideation     No past medical history on file.    CC Referred Self, MD  No address on file on close of this encounter      Again, thank you for allowing me to participate in the care of your patient.      Sincerely,    Dorothy Davis MD

## 2024-07-19 NOTE — PROGRESS NOTES
[facility] Dermatology Note  Encounter Date: Jul 19, 2024  Office Visit     Dermatology Problem List:  # Psoriasis (~60% BSA initially; now ~20-30%)  - bx 12/30/19 sebopsoriasis  - Current Tx: Secukinumab              - Started 2/2/23              - Safety labs: 2/2023 (wnl)  - Previous Tx: Enbrel 50mg (effective for psoriasis. No effective for AA), baricitinib (not effective), clobetasol ointment, ILK10 arms 6/17/21, triam 0.1% oint, Humira (ineffective)  # Alopecia areata  - bx 12/30/19  - s/p numerous ILK 10 10/22/20 (2cc), 6/17/21 (2cc), 11/18/21 (6cc ILK10, 1cc ILK5), 1/27/22 (3cc ILK10) without much relief  - Current Tx:   - prior: doxy (GI side effects), triam 0.1 oint, squaric acid, baricitinib 4mg daily  # Hx vitamin D deficiency  - vitamin D 12 2/2023, s/p 50K international unit(s) x 6w (10/2020)  - vitamin D 12 10/2020, s/p 50K international unit(s) x 6w (10/2020)  # Bacterial superinfection on scalp  - current: BPO wash  - s/p Keflex 500mg BID     ____________________________________________    Assessment & Plan:     # Alopecia universalis, with some patches of hair regrowth on the scalp, still with absence of hair on the eyebrows and eyelashes, overall improving but not at goal  - Discussed oral therapies and reviewed holding off on dual immunomodulator therapies at this time  - Start clobetasol ointment BID to scalp   - Obtain vitamin D level today given replacement completed     # Psoriasis, involving the scalp   - Continue Cosentyx for psoriasis   - Okay to apply clobetasol to arm involvement as well  - Reviewed risk of skin thinning and sparing use   - Monitoring labs completed today     Procedures Performed:   None    Follow-up: 2 month(s) in-person, or earlier for new or changing lesions    Staff and Resident:  Patient seen and staffed with Dr. Davis.     Oxana Junior MD  Dermatology Resident PGY-2    Patient was seen and examined with the dermatology resident. I  agree with the history, review of systems, physical examination, assessments and plan.  Dorothy Davis MD  Professor   Department of Dermatology  [facility]     ____________________________________________    CC: No chief complaint on file.    HPI:  Mr. Sulaiman Mccray is a 24 year old male who presents as a return patient for follow-up of hair loss, diagnosed as alopecia areata which is associated with psoriasis  - Last seen in-clinic on 5/2023  - Shedding or thinning, or both: None   - Current tx: None, just Cosentyx for psoriasis 300 mg monthly (stopped the topicals and baricitinib, unsure when he stopped or if these were helping)  - If using Rogaine, 1 cannister lasts how long: N/A   - Scalp or hair care habits/products: OTC shampoo   No Any new medications, supplements, or products? (please list below)     No Scalp pain   No Scalp burning   No Scalp itching    Some growth Eyebrow changes    Some growth Eyelash changes   No Beard changes    No Other body hair changes    No Nail changes    No Additional symptoms? (please list below)     - Overall course: Stable to improved   - COVID status: No recent history     Patient is otherwise feeling well, in usual state of health, and has no additional skin concerns today.     Labs:  Quant gold, CBC , CMP , and Vitamin D reviewed with low vitamin D and elevated LFTs.     Physical Exam:  GEN: Well developed, well-nourished, in no acute distress, in a pleasant mood.    SKIN: Focused examination of scalp, face, and nails was performed.  - Arceo type:III  - Patches of hair regrowth scattered along the scalp that mostly coincide over pink scaly patches   - No diffuse erythema   - No loose scaling of the scalp   - Decreased eyelash density  -  Decreased eyebrow density  - Nails painted   - No scalp folliculitis/pustules   - In comparison to prior photographs, improved   - Well circumscribed pink scaly plaques to the bilateral arms   - No other  lesions of concern on areas examined.   - Fine vellus and indeterminate  hairs on the eyebrows , none on eyelashes and scalp     Medications:  Current Outpatient Medications   Medication Sig Dispense Refill    Baricitinib 4 MG TABS Take 4 mg by mouth daily 30 tablet 3    clobetasol (TEMOVATE) 0.05 % external ointment Apply topically 2 times daily 45 g 3    omeprazole (PRILOSEC) 20 MG DR capsule Take 1 capsule (20 mg) by mouth daily 90 capsule 1    Secukinumab, 300 MG Dose, 150 MG/ML SOAJ Inject 300 mg Subcutaneous every 28 days 2 mL 3    triamcinolone (KENALOG) 0.1 % external ointment Apply topically 2 times daily (Patient not taking: Reported on 3/24/2022) 454 g 3    vitamin D2 (ERGOCALCIFEROL) 96680 units (1250 mcg) capsule Take 1 capsule (50,000 Units) by mouth once a week 8 capsule 0     Current Facility-Administered Medications   Medication Dose Route Frequency Provider Last Rate Last Admin    triamcinolone acetonide (KENALOG-10) injection 10 mg  10 mg Intra-Lesional Once Jena Merchant MD        triamcinolone acetonide (KENALOG-10) injection 20 mg  20 mg Intra-Lesional Once Dorothy Davis MD        triamcinolone acetonide (KENALOG-10) injection 20 mg  20 mg Intra-Lesional Once Dorothy Davis MD        triamcinolone acetonide (KENALOG-10) injection 30 mg  30 mg Intra-Lesional Once Trent Zavala MD        triamcinolone acetonide (KENALOG-10) injection 30 mg  30 mg Intra-Lesional Once Dorothy Davis MD        triamcinolone acetonide (KENALOG-10) injection 40 mg  40 mg Intra-Lesional Once Kristal Floyd MD        triamcinolone acetonide (KENALOG-10) injection 40 mg  40 mg Intra-Lesional Once Dorothy Davis MD          Past Medical History:   Patient Active Problem List   Diagnosis    ADHD (attention deficit hyperactivity disorder)    Alopecia areata    Anxiety    Major depressive disorder, single episode, mild (H24)    Suicidal ideation      No past medical history on file.    CC Referred Self, MD  No address on file on close of this encounter

## 2024-07-19 NOTE — NURSING NOTE
Dermatology Rooming Note    Sulaiman Mccray's goals for this visit include:   Chief Complaint   Patient presents with    Hair Loss     Sulaiman is here today for a AA follow up- he states he has been stable       Fede MENESES CMA

## 2024-07-21 LAB
QUANTIFERON MITOGEN: 10 IU/ML
QUANTIFERON NIL TUBE: 0 IU/ML
QUANTIFERON TB1 TUBE: 0 IU/ML
QUANTIFERON TB2 TUBE: 0

## 2024-07-22 LAB
GAMMA INTERFERON BACKGROUND BLD IA-ACNC: 0 IU/ML
M TB IFN-G BLD-IMP: NEGATIVE
M TB IFN-G CD4+ BCKGRND COR BLD-ACNC: 10 IU/ML
MITOGEN IGNF BCKGRD COR BLD-ACNC: 0 IU/ML
MITOGEN IGNF BCKGRD COR BLD-ACNC: 0 IU/ML

## 2024-07-29 ENCOUNTER — TELEPHONE (OUTPATIENT)
Dept: DERMATOLOGY | Facility: CLINIC | Age: 24
End: 2024-07-29
Payer: COMMERCIAL

## 2024-08-06 NOTE — TELEPHONE ENCOUNTER
Attempted to reach patient, no option to leave a VM. [employer] message sent and results letter mailed on this date to address on file    Sol Rose LPN

## 2024-09-04 ENCOUNTER — OFFICE VISIT (OUTPATIENT)
Dept: DERMATOLOGY | Facility: CLINIC | Age: 24
End: 2024-09-04
Attending: DERMATOLOGY
Payer: COMMERCIAL

## 2024-09-04 DIAGNOSIS — Z12.83 SKIN CANCER SCREENING: ICD-10-CM

## 2024-09-04 DIAGNOSIS — L40.0 PLAQUE PSORIASIS: Primary | ICD-10-CM

## 2024-09-04 PROCEDURE — 99214 OFFICE O/P EST MOD 30 MIN: CPT | Mod: GC | Performed by: DERMATOLOGY

## 2024-09-04 ASSESSMENT — PAIN SCALES - GENERAL: PAINLEVEL: NO PAIN (0)

## 2024-09-04 NOTE — NURSING NOTE
Dermatology Rooming Note    Sulaiman Mccray's goals for this visit include:   Chief Complaint   Patient presents with    Skin Check     No areas of concern.      Shyam Humphrey, EMT  Clinic Support  [facility]     [phone]    Employed by [employer]

## 2024-09-04 NOTE — PROGRESS NOTES
[facility] Dermatology Note  Encounter Date: Sep 4, 2024  Office Visit     Dermatology Problem List:  # Psoriasis (~60% BSA initially; now ~30-40%)  - bx 12/30/19 sebopsoriasis  - Current Tx: Secukinumab (started 2/2/23), safety labs 7/19/24, Kate pending   - Previous Tx: Enbrel 50mg (effective for psoriasis. No effective for AA), baricitinib (not effective), clobetasol ointment, ILK10 arms 6/17/21, triam 0.1% oint, Humira (ineffective)  # Alopecia areata  - bx 12/30/19  - s/p numerous ILK 10 10/22/20 (2cc), 6/17/21 (2cc), 11/18/21 (6cc ILK10, 1cc ILK5), 1/27/22 (3cc ILK10) without much relief  - Current Tx:   - prior: doxy (GI side effects), triam 0.1 oint, squaric acid, baricitinib 4mg daily  # Hx vitamin D deficiency  - vitamin D 12 2/2023, s/p 50K international unit(s) x 6w (10/2020)  - vitamin D 12 10/2020, s/p 50K international unit(s) x 6w (10/2020)  # Bacterial superinfection on scalp  - current: BPO wash  - s/p Keflex 500mg BID       ____________________________________________    Assessment & Plan:     # Psoriasis (BSA now ~30-40%).  Patient has trialed and failed multiple Biologics including baricitinib, Humira, and now secukinumab.  Patient reportedly did have some improvement with Enbrel, but this was not effective for his alopecia areata.  As such, would recommend transitioning from secukinumab to risankizumab, to approach targeting IL 23.  Additionally, patient may benefit as this medication only requires dosing every 3 months, rather than monthly injections.  Discussed that this medication would have a similar side effect profile to secukinumab.  Additionally, reviewed most recent biologic labs completed on 7/19/2024.  Patient is agreeable to transitioning to risankizumab.  -MTM consult placed  -Continue secukinumab until risankizumab approval  -Continue topical clobetasol twice daily PRN    #Benign skin exam including multiple benign nevi. Explained to patient benign nature of  lesion. No treatment is necessary at this time unless the lesion changes or becomes symptomatic.     Procedures Performed: None    Follow-up: 3 month(s) in-person, or earlier for new or changing lesions    Staff and Resident:  Patient seen and staffed with Dr. Faust.       Oxana Junior MD  Dermatology Resident PGY-2  I, Kristal Floyd MD, saw this patient with the resident and agree with the resident s findings and plan of care as documented in the resident s note.      ____________________________________________    CC: Skin Check (No areas of concern. )    HPI:  Mr. Sulaiman Mccray is a(n) 24 year old male who presents today as a return patient for full-body skin exam.    Patient denies any moles that are changing in size or color.  Additionally denies any symptomatic moles that are causing any pain, itching, burning, bleeding.  Denies any personal or family history of skin cancer.  Reports that he does not wear sunscreen on a regular basis.    Patient is also here with history of plaque psoriasis.  He reports that he has continued on his secukinumab and does not have any problems with this medication or dosing.  He is also applying clobetasol daily as needed.  He does report that the clobetasol is helpful.  Denies any joint symptoms at this time.    Patient is otherwise feeling well, without additional skin concerns.    Labs Reviewed:  Quant gold, CMP, lipids, CBC on 7/19/2024 were acceptable    Physical Exam:  Vitals: There were no vitals taken for this visit.  SKIN: Total skin excluding the undergarment areas was performed. The exam included the head/face, neck, both arms, chest, back, abdomen, both legs, digits and/or nails.   -Thickened pink plaques with micaceous scale scattered along the scalp bilateral arms, chest, abdomen, and bilateral legs.   - Multiple regular brown pigmented macules and papules are identified on the trunk and extremities.   - No other lesions of concern on areas  examined.     Medications:  Current Outpatient Medications   Medication Sig Dispense Refill    clobetasol (TEMOVATE) 0.05 % external ointment Apply topically 2 times daily 45 g 2    omeprazole (PRILOSEC) 20 MG DR capsule Take 1 capsule (20 mg) by mouth daily 90 capsule 1    Secukinumab, 300 MG Dose, 150 MG/ML SOAJ Inject 300 mg Subcutaneous every 28 days 2 mL 3    Baricitinib 4 MG TABS Take 4 mg by mouth daily (Patient not taking: Reported on 7/19/2024) 30 tablet 3    clobetasol (TEMOVATE) 0.05 % external ointment Apply topically 2 times daily (Patient not taking: Reported on 7/19/2024) 45 g 3    triamcinolone (KENALOG) 0.1 % external ointment Apply topically 2 times daily (Patient not taking: Reported on 3/24/2022) 454 g 3    vitamin D2 (ERGOCALCIFEROL) 57045 units (1250 mcg) capsule Take 1 capsule (50,000 Units) by mouth once a week (Patient not taking: Reported on 9/4/2024) 8 capsule 0     Current Facility-Administered Medications   Medication Dose Route Frequency Provider Last Rate Last Admin    triamcinolone acetonide (KENALOG-10) injection 10 mg  10 mg Intra-Lesional Once Jena Merchant MD        triamcinolone acetonide (KENALOG-10) injection 20 mg  20 mg Intra-Lesional Once Dorothy Davis MD        triamcinolone acetonide (KENALOG-10) injection 20 mg  20 mg Intra-Lesional Once Dorothy Davis MD        triamcinolone acetonide (KENALOG-10) injection 30 mg  30 mg Intra-Lesional Once Trent Zavala MD        triamcinolone acetonide (KENALOG-10) injection 30 mg  30 mg Intra-Lesional Once Dorothy Davis MD        triamcinolone acetonide (KENALOG-10) injection 40 mg  40 mg Intra-Lesional Once Kristal Floyd MD        triamcinolone acetonide (KENALOG-10) injection 40 mg  40 mg Intra-Lesional Once Dorothy Davis MD          Past Medical History:   Patient Active Problem List   Diagnosis    ADHD (attention deficit hyperactivity disorder)     Alopecia areata    Anxiety    Major depressive disorder, single episode, mild (H24)    Suicidal ideation     History reviewed. No pertinent past medical history.    CC Dorothy Davis MD  [address]  [place] [address] on close of this encounter.

## 2024-09-04 NOTE — LETTER
9/4/2024       RE: Sulaiman Mccray  [address] [id number]     Dear Colleague,    Thank you for referring your patient, Sulaiman Mccray, to the [facility] DERMATOLOGY CLINIC [place] at [facility]. Please see a copy of my visit note below.    [facility] Dermatology Note  Encounter Date: Sep 4, 2024  Office Visit     Dermatology Problem List:  # Psoriasis (~60% BSA initially; now ~30-40%)  - bx 12/30/19 sebopsoriasis  - Current Tx: Secukinumab (started 2/2/23), safety labs 7/19/24, Skyrizi pending   - Previous Tx: Enbrel 50mg (effective for psoriasis. No effective for AA), baricitinib (not effective), clobetasol ointment, ILK10 arms 6/17/21, triam 0.1% oint, Humira (ineffective)  # Alopecia areata  - bx 12/30/19  - s/p numerous ILK 10 10/22/20 (2cc), 6/17/21 (2cc), 11/18/21 (6cc ILK10, 1cc ILK5), 1/27/22 (3cc ILK10) without much relief  - Current Tx:   - prior: doxy (GI side effects), triam 0.1 oint, squaric acid, baricitinib 4mg daily  # Hx vitamin D deficiency  - vitamin D 12 2/2023, s/p 50K international unit(s) x 6w (10/2020)  - vitamin D 12 10/2020, s/p 50K international unit(s) x 6w (10/2020)  # Bacterial superinfection on scalp  - current: BPO wash  - s/p Keflex 500mg BID       ____________________________________________    Assessment & Plan:     # Psoriasis (BSA now ~30-40%).  Patient has trialed and failed multiple Biologics including baricitinib, Humira, and now secukinumab.  Patient reportedly did have some improvement with Enbrel, but this was not effective for his alopecia areata.  As such, would recommend transitioning from secukinumab to risankizumab, to approach targeting IL 23.  Additionally, patient may benefit as this medication only requires dosing every 3 months, rather than monthly injections.  Discussed that this medication would have a similar side effect profile to secukinumab.   Additionally, reviewed most recent biologic labs completed on 7/19/2024.  Patient is agreeable to transitioning to risankizumab.  -MTM consult placed  -Continue secukinumab until risankizumab approval  -Continue topical clobetasol twice daily PRN    #Benign skin exam including multiple benign nevi. Explained to patient benign nature of lesion. No treatment is necessary at this time unless the lesion changes or becomes symptomatic.     Procedures Performed: None    Follow-up: 3 month(s) in-person, or earlier for new or changing lesions    Staff and Resident:  Patient seen and staffed with Dr. Faust.       Oxana Junior MD  Dermatology Resident PGY-2  I, Kristal Floyd MD, saw this patient with the resident and agree with the resident s findings and plan of care as documented in the resident s note.      ____________________________________________    CC: Skin Check (No areas of concern. )    HPI:  Mr. Sulaiman Mccray is a(n) 24 year old male who presents today as a return patient for full-body skin exam.    Patient denies any moles that are changing in size or color.  Additionally denies any symptomatic moles that are causing any pain, itching, burning, bleeding.  Denies any personal or family history of skin cancer.  Reports that he does not wear sunscreen on a regular basis.    Patient is also here with history of plaque psoriasis.  He reports that he has continued on his secukinumab and does not have any problems with this medication or dosing.  He is also applying clobetasol daily as needed.  He does report that the clobetasol is helpful.  Denies any joint symptoms at this time.    Patient is otherwise feeling well, without additional skin concerns.    Labs Reviewed:  Quant gold, CMP, lipids, CBC on 7/19/2024 were acceptable    Physical Exam:  Vitals: There were no vitals taken for this visit.  SKIN: Total skin excluding the undergarment areas was performed. The exam included the head/face,  neck, both arms, chest, back, abdomen, both legs, digits and/or nails.   -Thickened pink plaques with micaceous scale scattered along the scalp bilateral arms, chest, abdomen, and bilateral legs.   - Multiple regular brown pigmented macules and papules are identified on the trunk and extremities.   - No other lesions of concern on areas examined.     Medications:  Current Outpatient Medications   Medication Sig Dispense Refill     clobetasol (TEMOVATE) 0.05 % external ointment Apply topically 2 times daily 45 g 2     omeprazole (PRILOSEC) 20 MG DR capsule Take 1 capsule (20 mg) by mouth daily 90 capsule 1     Secukinumab, 300 MG Dose, 150 MG/ML SOAJ Inject 300 mg Subcutaneous every 28 days 2 mL 3     Baricitinib 4 MG TABS Take 4 mg by mouth daily (Patient not taking: Reported on 7/19/2024) 30 tablet 3     clobetasol (TEMOVATE) 0.05 % external ointment Apply topically 2 times daily (Patient not taking: Reported on 7/19/2024) 45 g 3     triamcinolone (KENALOG) 0.1 % external ointment Apply topically 2 times daily (Patient not taking: Reported on 3/24/2022) 454 g 3     vitamin D2 (ERGOCALCIFEROL) 73020 units (1250 mcg) capsule Take 1 capsule (50,000 Units) by mouth once a week (Patient not taking: Reported on 9/4/2024) 8 capsule 0     Current Facility-Administered Medications   Medication Dose Route Frequency Provider Last Rate Last Admin     triamcinolone acetonide (KENALOG-10) injection 10 mg  10 mg Intra-Lesional Once Jena Merchant MD         triamcinolone acetonide (KENALOG-10) injection 20 mg  20 mg Intra-Lesional Once Dorothy Davis MD         triamcinolone acetonide (KENALOG-10) injection 20 mg  20 mg Intra-Lesional Once Dorothy Davis MD         triamcinolone acetonide (KENALOG-10) injection 30 mg  30 mg Intra-Lesional Once Trent Zavala MD         triamcinolone acetonide (KENALOG-10) injection 30 mg  30 mg Intra-Lesional Once Dorothy Davis MD          triamcinolone acetonide (KENALOG-10) injection 40 mg  40 mg Intra-Lesional Once Kristal Floyd MD         triamcinolone acetonide (KENALOG-10) injection 40 mg  40 mg Intra-Lesional Once Dorothy Davis MD          Past Medical History:   Patient Active Problem List   Diagnosis     ADHD (attention deficit hyperactivity disorder)     Alopecia areata     Anxiety     Major depressive disorder, single episode, mild (H24)     Suicidal ideation     History reviewed. No pertinent past medical history.    CC Dorothy Davis MD  [address] [place] [address] [address]  [place] [address] on close of this encounter.      Again, thank you for allowing me to participate in the care of your patient.      Sincerely,    Kristal Floyd MD

## 2024-09-06 ENCOUNTER — VIRTUAL VISIT (OUTPATIENT)
Dept: DERMATOLOGY | Facility: CLINIC | Age: 24
End: 2024-09-06
Attending: DERMATOLOGY
Payer: COMMERCIAL

## 2024-09-06 DIAGNOSIS — L63.9 ALOPECIA AREATA: ICD-10-CM

## 2024-09-06 DIAGNOSIS — L40.0 PLAQUE PSORIASIS: Primary | ICD-10-CM

## 2024-09-06 DIAGNOSIS — R79.89 LOW VITAMIN D LEVEL: ICD-10-CM

## 2024-09-06 NOTE — PROGRESS NOTES
Medication Therapy Management (MTM) Encounter    ASSESSMENT:                            Psoriasis: Uncontrolled at this time with Cosentyx.  He will go ahead and get his last dose of Cosentyx tomorrow.  I did review with him that Skyrizi was approved and he can call [place] [facility] pharmacy to set the order.  After at least 1 week after Cosentyx, he can get the Skyrizi.  Today we reviewed Skyrizi in detail including mechanism of action, dosing, administration, storage, and immunosuppression.  I will set him administration video on [employer].  Continue to this time.  I strongly encouraged vaccines, especially he is overdue for a Tdap vaccine.  In addition I would recommend influenza and COVID-19 in the fall.    Alopecia areata: Continue current regimen and following with Dr. Davis.    Vitamin D deficiency: Last vitamin D level was low, I reviewed with him the recommendation from Dr. Davis to start 5000 units daily.  I did review with him that I can send a prescription and he was open to that which will hopefully help his insurance.  Will recheck vitamin D level in the future to ensure adequate supplementation.    PLAN:                            1.  Patient to give last dose of Cosentyx tomorrow (9/7/2024)  2.  Patient to call therapy specialty pharmacy to order Skyrizi.  Okay to start 1 week after tomorrow.    Administration video sent via [employer]  3.  Vaccines to consider: Tdap, influenza, COVID.  4.  Patient to start vitamin D 5000 units daily --Rx sent to [facility] mail order    Follow-up: 3 months after starting Skyrizi with MTM    SUBJECTIVE/OBJECTIVE:                          Sulaiman Mccray is a 24 year old male called for an initial visit. He was referred to me from Dr. Floyd and Oxana Junior MD     Reason for visit: Skyrizi for psorasis, failed cosentyx, humira, and baricitinib  Medication Adherence/Access: Patient was familiar with his medications, and noted no  concerns.    Psoriasis:   -Cosentyx 300 mg every 28 days.  Started 2/2/23.  Has 1 pen at home, next dose due tomorrow.  -Skyrizi 150 mg subcu on day 0, day 28, then every 12 weeks (PA approved -- [place] [facility] pharmacy. $0 copay)  -Clobetasol 0.05% ointment twice daily as needed  -Triamcinolone 0.1% ointment as needed  Met with Dr. Floyd on 9/4/2024  Was recommended to switch Cosentyx to Skyrizi.  PSA on 9/4/2024 ~30-40% (baseline was ~60%)  No joint symptoms.  Locations: Arms, chest, abdomen, legs.  Reports that the psoriasis on his legs is new.  Feels like Cosentyx did not help enough, helped in the beginning then it stopped working.  Previous Tx: Enbrel 50mg (effective for psoriasis. No effective for AA), baricitinib (not effective), clobetasol ointment, ILK10 arms 6/17/21, triam 0.1% oint, Humira (ineffective)  bx 12/30/19 sebopsoriasis  No illness history or side effects from Cosentyx    Pre-Biologic Screening:  -- Hep B Surface Antibody nonreactive on 6/2/2022  -- Hep B Surface Antigen nonreactive on 6/2/2022  -- Hep B Core Antibody nonreactive on 6/2/2022  -- Hep C Antibody nonreactive on 6/2/2022  -- QuantiFERON-Tb: Negative on 7/19/2024    Vaccinations:  All patients on biologics should avoid live vaccines (varicella/VZV, intranasal influenza, MMR, or yellow fever vaccine (if traveling))    -- Influenza (every year): Last in 2018  -- TdaP (every 10 years): 11/4/2013  -- Pneumococcal Pneumonia: Prevnar 7 history on file   Prevnar-13:   Pneumovax-23:   Prevnar-20:  -- COVID-19: Last 1/10/2022  -- RSV: n/a  -- Varicella/Zoster    Varicella: 2/7/2002 (1 dose only)   Zoster:    Alopecia areata:  -Clobetasol ointment twice daily to scalp  bx 12/30/19   Follows with  history of IL K without much relief. prior: doxy (GI side effects), triam 0.1 oint, squaric acid, baricitinib 4mg daily     Vitamin D deficiency:  Dr. Davis recommended 5000 units daily -he is not taking any vitamin D at this  time.  Denies any symptoms of vitamin D deficiency.  Lab Results   Component Value Date    VITDT 14 (L) 07/19/2024    VITDT 12 (L) 02/02/2023    VITDT 12 (L) 10/22/2020       Today's Vitals: There were no vitals taken for this visit.    Allergies/ADRs: Reviewed in chart  Past Medical History: Reviewed in chart  Tobacco: He reports that he has never smoked. He has never used smokeless tobacco.  Alcohol: Reviewed in chart    ----------------      I spent 15 minutes with this patient today. All changes were made via collaborative practice agreement with Kristal Floyd. A copy of the visit note was provided to the patient's provider(s).    A summary of these recommendations was sent via [employer].    Linh Donato, Pharm.D., [facility]   Medication Therapy Management Pharmacist   [facility] Dermatology    Telemedicine Visit Details  Type of service:  Telephone visit  Start Time:  12:30 PM  End Time:  12:45 PM     Medication Therapy Recommendations  Low vitamin D level    Current Medication: cholecalciferol (VITAMIN D3) 125 mcg (5000 units) capsule   Rationale: Patient forgets to take - Adherence - Adherence   Recommendation: Provide Adherence Intervention   Status: Accepted - no CPA Needed         Plaque psoriasis    Current Medication: Risankizumab-rzaa (SKYRIZI) 150 MG/ML subcutaneous   Rationale: Preventive therapy - Needs additional medication therapy - Indication   Recommendation: Order Vaccine   Status: Accepted - no CPA Needed

## 2024-09-06 NOTE — Clinical Note
9/6/2024       RE: Sulaiman Mccray  [address] [facility] [address]     Dear Colleague,    Thank you for referring your patient, Sulaiman Mccray, to the [facility] RHEUMATOLOGY CLINIC [place] at [facility]. Please see a copy of my visit note below.    Medication Therapy Management (MTM) Encounter    ASSESSMENT:                            ***:  ***      PLAN:                            ***    Follow-up: {followuptest2:317508}    SUBJECTIVE/OBJECTIVE:                          Sulaiman Mccray is a 24 year old male { :155566} for {mtmvisit:835246} Oxana Junior MD     Reason for visit: Skyrizi  psorasis, failed cosentyx, humira, and baricitinib  Medication Adherence/Access: {fumedadherence:759854}    Psoriasis:   -Cosentyx secukinumab. Started 2/2/23.  Going to give tomorrow.   -Skyrizi 150 mg subcu on day 0, day 28, then every 12 weeks (PA approved -- [place] specialty pharmacy. $0 copay)  -Clobetasol 0.05% ointment twice daily as needed  -Triamcinolone 0.1% ointment  Met with Dr. Floyd on 9/4/2024  Was recommended to switch Cosentyx to Skyrizi.  PSA on 9/4/2024 ~30-40% (baseline was ~60%)  No joint symptoms.  Locations: Arms, chest, abdomen, legs  Previous Tx: Enbrel 50mg (effective for psoriasis. No effective for AA), baricitinib (not effective), clobetasol ointment, ILK10 arms 6/17/21, triam 0.1% oint, Humira (ineffective)  bx 12/30/19 sebopsoriasis  No illness history    Reports not doing enough, helped in the beginning then stopped. Arms long time, the legs.     Pre-Biologic Screening:  -- Hep B Surface Antibody nonreactive on 6/2/2022  -- Hep B Surface Antigen nonreactive on 6/2/2022  -- Hep B Core Antibody nonreactive on 6/2/2022  -- Hep C Antibody nonreactive on 6/2/2022  -- QuantiFERON-Tb: Negative on 7/19/2024    Vaccinations:  All patients on biologics should avoid live vaccines (varicella/VZV, intranasal  "influenza, MMR, or yellow fever vaccine (if traveling))    -- Influenza (every year): Last in 2018  -- TdaP (every 10 years): 11/4/2013  -- Pneumococcal Pneumonia: Prevnar 7 history on file   Prevnar-13:   Pneumovax-23:   Prevnar-20:  -- COVID-19: Last 1/10/2022  -- RSV: n/a  -- Varicella/Zoster    Varicella: 2/7/2002 (1 dose only)   Zoster:    Alopecia areata:  -Clobetasol ointment twice daily to scalp  bx 12/30/19   Follows with Dr. Kerwin acosta history of IL K without much relief. prior: doxy (GI side effects), triam 0.1 oint, squaric acid, baricitinib 4mg daily     Vitamin D deficiency:  Afterwards he recommended 5000 units daily - stopped.   Lab Results   Component Value Date    VITDT 14 (L) 07/19/2024    VITDT 12 (L) 02/02/2023    VITDT 12 (L) 10/22/2020       Today's Vitals: There were no vitals taken for this visit.    Allergies/ADRs: {1/2/3/4/5:554911}  Past Medical History: {1/2/3/4/5:993474}  Tobacco: He reports that he has never smoked. He has never used smokeless tobacco.  Alcohol: {ALCOHOL CONSUMPTION HX:718090}  {Social and Goals:253155}  ----------------  {OUSMANE?:008355}    I spent {mtm total time 3:122595} with this patient today. { :587839}. A copy of the visit note was provided to the patient's provider(s).    A summary of these recommendations {GIVEN/NOT GIVEN:689720}.    Linh Donato, Pharm.D., BCACP   Medication Therapy Management Pharmacist   [facility] Dermatology    Telemedicine Visit Details  Type of service:  {telemedvisitmtm:809442::\"Telephone visit\"}  Start Time: {video/phone visit start time:152948}  End Time: {video/phone visit end time:152948}     Medication Therapy Recommendations  No medication therapy recommendations to display       Medication Therapy Management (MTM) Encounter    ASSESSMENT:                            Psoriasis: Uncontrolled at this time with Cosentyx.  He will go ahead and get his last dose of Cosentyx tomorrow.  I did review with him that Kate was " approved and he can call [place] specialty pharmacy to set the order.  After at least 1 week after Cosentyx, he can get the Skyrizi.  Today we reviewed Skyrizi in detail including mechanism of action, dosing, administration, storage, and immunosuppression.  I will set him administration video on [employer].  Continue to this time.  I strongly encouraged vaccines, especially he is overdue for a Tdap vaccine.  In addition I would recommend influenza and COVID-19 in the fall.    Alopecia areata: Continue current regimen and following with Dr. Davis.    Vitamin D deficiency: Last vitamin D level was low, I reviewed with him the recommendation from Dr. Davis to start 5000 units daily.  I did review with him that I can send a prescription and he was open to that which will hopefully help his insurance.  Will recheck vitamin D level in the future to ensure adequate supplementation.    PLAN:                            1.  Patient to give last dose of Cosentyx tomorrow (9/7/2024)  2.  Patient to call therapy specialty pharmacy to order Skyrizi.  Okay to start 1 week after tomorrow.    Administration video sent via [employer]  3.  Vaccines to consider: Tdap, influenza, COVID.  4.  Patient to start vitamin D 5000 units daily --[facility]    Follow-up: 3 months after starting Skyrizi with MTM    SUBJECTIVE/OBJECTIVE:                          Sulaiman Mccray is a 24 year old male called for an initial visit. He was referred to me from Dr. Floyd and Oxana Junior MD     Reason for visit: Skyrizi for psorasis, failed cosentyx, humira, and baricitinib  Medication Adherence/Access: Patient was familiar with his medications, and noted no concerns.    Psoriasis:   -Cosentyx 300 mg every 28 days.  Started 2/2/23.  Has 1 pen at home, next dose due tomorrow.  -Skyrizi 150 mg subcu on day 0, day 28, then every 12 weeks (PA approved -- [place] [facility] pharmacy. $0 copay)  -Clobetasol 0.05% ointment  twice daily as needed  -Triamcinolone 0.1% ointment as needed  Met with Dr. Floyd on 9/4/2024  Was recommended to switch Cosentyx to Skyrizi.  PSA on 9/4/2024 ~30-40% (baseline was ~60%)  No joint symptoms.  Locations: Arms, chest, abdomen, legs.  Reports that the psoriasis on his legs is new.  Feels like Cosentyx did not help enough, helped in the beginning then it stopped working.  Previous Tx: Enbrel 50mg (effective for psoriasis. No effective for AA), baricitinib (not effective), clobetasol ointment, ILK10 arms 6/17/21, triam 0.1% oint, Humira (ineffective)  bx 12/30/19 sebopsoriasis  No illness history or side effects from Cosentyx    Pre-Biologic Screening:  -- Hep B Surface Antibody nonreactive on 6/2/2022  -- Hep B Surface Antigen nonreactive on 6/2/2022  -- Hep B Core Antibody nonreactive on 6/2/2022  -- Hep C Antibody nonreactive on 6/2/2022  -- QuantiFERON-Tb: Negative on 7/19/2024    Vaccinations:  All patients on biologics should avoid live vaccines (varicella/VZV, intranasal influenza, MMR, or yellow fever vaccine (if traveling))    -- Influenza (every year): Last in 2018  -- TdaP (every 10 years): 11/4/2013  -- Pneumococcal Pneumonia: Prevnar 7 history on file   Prevnar-13:   Pneumovax-23:   Prevnar-20:  -- COVID-19: Last 1/10/2022  -- RSV: n/a  -- Varicella/Zoster    Varicella: 2/7/2002 (1 dose only)   Zoster:    Alopecia areata:  -Clobetasol ointment twice daily to scalp  bx 12/30/19   Follows with  history of IL K without much relief. prior: doxy (GI side effects), triam 0.1 oint, squaric acid, baricitinib 4mg daily     Vitamin D deficiency:  Dr. Davis recommended 5000 units daily -he is not taking any vitamin D at this time.  Denies any symptoms of vitamin D deficiency.  Lab Results   Component Value Date    VITDT 14 (L) 07/19/2024    VITDT 12 (L) 02/02/2023    VITDT 12 (L) 10/22/2020       Today's Vitals: There were no vitals taken for this visit.    Allergies/ADRs: Reviewed in  chart  Past Medical History: Reviewed in chart  Tobacco: He reports that he has never smoked. He has never used smokeless tobacco.  Alcohol: Reviewed in chart    ----------------      I spent 15 minutes with this patient today. All changes were made via collaborative practice agreement with Kristal Floyd. A copy of the visit note was provided to the patient's provider(s).    A summary of these recommendations was sent via [employer].    Kevin Yusuf, Pharm.D., BCACP   Medication Therapy Management Pharmacist   [facility] Dermatology    Telemedicine Visit Details  Type of service:  Telephone visit  Start Time:  12:30 PM  End Time:  12:45 PM     Medication Therapy Recommendations  No medication therapy recommendations to display         Again, thank you for allowing me to participate in the care of your patient.      Sincerely,    KEVIN YUSUF PharmD

## 2024-09-25 ENCOUNTER — APPOINTMENT (OUTPATIENT)
Dept: INTERPRETER SERVICES | Facility: CLINIC | Age: 24
End: 2024-09-25
Payer: COMMERCIAL

## 2024-09-25 ENCOUNTER — PHARMACY VISIT (OUTPATIENT)
Dept: ADMINISTRATIVE | Facility: CLINIC | Age: 24
End: 2024-09-25
Payer: COMMERCIAL

## 2024-09-25 NOTE — PROGRESS NOTES
Psoriasis Clinical Follow Up Assessment     Spoke with Mom, Yuliya, and patient via  for TM assessment.     Current Regimen: Skyrizi 150mg at Week 0 and Week 4, then every 12 weeks.     He was able to get started and first dose was on 9/23/24. Next dose due 10/21/24.     Mom denies SE or ISR. Everything went well.     PsO: Mom was asking when will they see any changes in his skin, discussed time to treat, could be up to 3 months. Mom understood and stated 'they will be patient.' Unable to gather specifics in symptoms today. Mom did not have any questions or concerns.     Ok to follow up in one month and invited them to call sooner if questions or concerns arise.     Camille Toth, Pharm.D.[facility] Pharmacist  [address] [place] [address]  [email or web address]  Office: [phone]

## 2024-12-26 DIAGNOSIS — L63.9 ALOPECIA AREATA: ICD-10-CM

## 2024-12-26 DIAGNOSIS — L40.9 PSORIASIS: ICD-10-CM

## 2024-12-30 RX ORDER — CLOBETASOL PROPIONATE 0.5 MG/G
OINTMENT TOPICAL
Qty: 45 G | Refills: 1 | Status: SHIPPED | OUTPATIENT
Start: 2024-12-30

## 2025-01-06 ENCOUNTER — OFFICE VISIT (OUTPATIENT)
Dept: DERMATOLOGY | Facility: CLINIC | Age: 25
End: 2025-01-06
Payer: COMMERCIAL

## 2025-01-06 ENCOUNTER — OFFICE VISIT (OUTPATIENT)
Dept: PHARMACY | Facility: CLINIC | Age: 25
End: 2025-01-06
Attending: DERMATOLOGY
Payer: COMMERCIAL

## 2025-01-06 DIAGNOSIS — L40.9 PSORIASIS: Primary | ICD-10-CM

## 2025-01-06 DIAGNOSIS — L40.9 PSORIASIS: ICD-10-CM

## 2025-01-06 DIAGNOSIS — R79.89 LOW VITAMIN D LEVEL: ICD-10-CM

## 2025-01-06 DIAGNOSIS — L63.9 ALOPECIA AREATA: ICD-10-CM

## 2025-01-06 PROCEDURE — 99213 OFFICE O/P EST LOW 20 MIN: CPT | Mod: GC | Performed by: DERMATOLOGY

## 2025-01-06 PROCEDURE — G0463 HOSPITAL OUTPT CLINIC VISIT: HCPCS | Performed by: PHARMACIST

## 2025-01-06 RX ORDER — CLOBETASOL PROPIONATE 0.5 MG/G
OINTMENT TOPICAL
Qty: 45 G | Refills: 1 | Status: SHIPPED | OUTPATIENT
Start: 2025-01-06

## 2025-01-06 ASSESSMENT — PAIN SCALES - GENERAL: PAINLEVEL_OUTOF10: NO PAIN (0)

## 2025-01-06 NOTE — LETTER
1/6/2025       RE: Sulaiman Mccray  [address] [address]     Dear Colleague,    Thank you for referring your patient, Sulaiman Mccray, to the [facility] DERMATOLOGY [facility] [place] at [facility]. Please see a copy of my visit note below.    [facility] Dermatology Note  Encounter Date: Jan 6, 2025  Office Visit     Dermatology Problem List:  # Psoriasis (~60% BSA initially; now <5%)  - bx 12/30/19 sebopsoriasis  - Current Tx: Risankizumab (start 9/2024)  - Previous Tx: Secukinumab (started 2/2/23), Enbrel 50mg (effective for psoriasis. No effective for AA), baricitinib (not effective), clobetasol ointment, ILK10 arms 6/17/21, triam 0.1% oint, Humira (ineffective)  # Alopecia areata  - bx 12/30/19  - s/p numerous ILK 10 10/22/20 (2cc), 6/17/21 (2cc), 11/18/21 (6cc ILK10, 1cc ILK5), 1/27/22 (3cc ILK10) without much relief  - Current Tx:   - prior: doxy (GI side effects), triam 0.1 oint, squaric acid, baricitinib 4mg daily  # Hx vitamin D deficiency  - vitamin D 12 2/2023, s/p 50K international unit(s) x 6w (10/2020)  - vitamin D 12 10/2020, s/p 50K international unit(s) x 6w (10/2020)  # Bacterial superinfection on scalp  - current: BPO wash  - s/p Keflex 500mg BID    ____________________________________________    Assessment & Plan:     # Psoriasis (BSA now <5%)  Previously failed multiple Biologics including baricitinib, Humira, secukinumab but has now almost completely cleared with risankizumab. Will continue this medication.  -Continue risankizumab  -Continue topical clobetasol twice daily PRN       Follow-up: 7/2025 with quant gold.    Staff and Resident:     Michelle Floyd and Noris.    Franki Hamm MD  Medicine/Dermatology PGY-4  Page via [employer]  Pager: [phone]     I, Kristal Floyd MD, saw this patient with the resident and agree with the resident s findings and plan of care as documented in the  resident s note.    ____________________________________________    CC: Derm Problem (Feels like things have improved. Since last visit. )    HPI:  Mr. Sulaiman Mccary is a(n) 24 year old male who presents today as a return patient for psoriasis. Last seen 9/2024 at which time he was switched from secukinumab for risankizumab.    Reports he's had dramatic improvement since last visit. Only remaining activity is a few spots on his arms, using clobetasol every other day at most. No itchy spots. No joint pains or stiffness in the morning in hands, back. Otherwise no new, tender, non-healing, burning, bleeding, tingling, pruritic lesions. Denies fevers, chills, unintended weight loss, night sweats, energy changes, new headaches or vision changes. No injection site issues or reactions. No other concerns today.    Labs Reviewed:  N/A    Physical Exam:  Vitals: There were no vitals taken for this visit.  SKIN: Waist-up skin, which includes the head/face, neck, both arms, chest, back, abdomen, digits and/or nails was examined, as well as the bilateral shins and knees.  - Lightly scaly light valencia pink fading 2 cm nummular plaque on L volar forearm  - Nummular to polycylic light tan hyperpigmented patches on bilateral arms, chest, abdomen, knees, shins in areas of prior psoriasis involvement  - No other lesions of concern on areas examined.     Medications:  Current Outpatient Medications   Medication Sig Dispense Refill     cholecalciferol (VITAMIN D3) 125 mcg (5000 units) capsule Take 1 capsule (125 mcg) by mouth daily. 90 capsule 0     clobetasol (TEMOVATE) 0.05 % external ointment Apply twice a day FIVE days of the week, skin needs a 2 day break 45 g 1     Risankizumab-rzaa (SKYRIZI) 150 MG/ML subcutaneous Inject 1 mL (150 mg) subcutaneously See Admin Instructions. - 1 injection on day 0, 1 injection on day 28, then 1 injection every 12 weeks 1 mL 1     Risankizumab-rzaa (SKYRIZI) 150 MG/ML subcutaneous Inject 1 mL  (150 mg) subcutaneously every 3 months. 1 mL 2     triamcinolone (KENALOG) 0.1 % external ointment Apply topically 2 times daily (Patient not taking: Reported on 1/6/2025) 454 g 3     Current Facility-Administered Medications   Medication Dose Route Frequency Provider Last Rate Last Admin     triamcinolone acetonide (KENALOG-10) injection 10 mg  10 mg Intra-Lesional Once Jena Merchant MD         triamcinolone acetonide (KENALOG-10) injection 20 mg  20 mg Intra-Lesional Once Dorothy Davis MD         triamcinolone acetonide (KENALOG-10) injection 20 mg  20 mg Intra-Lesional Once Dorothy Davis MD         triamcinolone acetonide (KENALOG-10) injection 30 mg  30 mg Intra-Lesional Once Trent Zavala MD         triamcinolone acetonide (KENALOG-10) injection 30 mg  30 mg Intra-Lesional Once Dorothy Davis MD         triamcinolone acetonide (KENALOG-10) injection 40 mg  40 mg Intra-Lesional Once Kristal Floyd MD         triamcinolone acetonide (KENALOG-10) injection 40 mg  40 mg Intra-Lesional Once Dorothy Davis MD          Past Medical History:   Patient Active Problem List   Diagnosis     ADHD (attention deficit hyperactivity disorder)     Alopecia areata     Anxiety     Major depressive disorder, single episode, mild (H)     Suicidal ideation     History reviewed. No pertinent past medical history.    CC Referred MD Sarath  No address on file on close of this encounter.      Again, thank you for allowing me to participate in the care of your patient.      Sincerely,    Kristal Floyd MD

## 2025-01-06 NOTE — PROGRESS NOTES
[facility] Dermatology Note  Encounter Date: Jan 6, 2025  Office Visit     Dermatology Problem List:  # Psoriasis (~60% BSA initially; now <5%)  - bx 12/30/19 sebopsoriasis  - Current Tx: Risankizumab (start 9/2024)  - Previous Tx: Secukinumab (started 2/2/23), Enbrel 50mg (effective for psoriasis. No effective for AA), baricitinib (not effective), clobetasol ointment, ILK10 arms 6/17/21, triam 0.1% oint, Humira (ineffective)  # Alopecia areata  - bx 12/30/19  - s/p numerous ILK 10 10/22/20 (2cc), 6/17/21 (2cc), 11/18/21 (6cc ILK10, 1cc ILK5), 1/27/22 (3cc ILK10) without much relief  - Current Tx:   - prior: doxy (GI side effects), triam 0.1 oint, squaric acid, baricitinib 4mg daily  # Hx vitamin D deficiency  - vitamin D 12 2/2023, s/p 50K international unit(s) x 6w (10/2020)  - vitamin D 12 10/2020, s/p 50K international unit(s) x 6w (10/2020)  # Bacterial superinfection on scalp  - current: BPO wash  - s/p Keflex 500mg BID    ____________________________________________    Assessment & Plan:     # Psoriasis (BSA now <5%)  Previously failed multiple Biologics including baricitinib, Humira, secukinumab but has now almost completely cleared with risankizumab. Will continue this medication.  -Continue risankizumab  -Continue topical clobetasol twice daily PRN       Follow-up: 7/2025 with quant gold.    Staff and Resident:     Michelle Floyd and Noris.    Franki Hamm MD  Medicine/Dermatology PGY-4  Page via [employer]  Pager: [phone]     I, Kristal Floyd MD, saw this patient with the resident and agree with the resident s findings and plan of care as documented in the resident s note.    ____________________________________________    CC: Derm Problem (Feels like things have improved. Since last visit. )    HPI:  Mr. Sulaiman Mccray is a(n) 24 year old male who presents today as a return patient for psoriasis. Last seen 9/2024 at which time he was switched from secukinumab for  risankizumab.    Reports he's had dramatic improvement since last visit. Only remaining activity is a few spots on his arms, using clobetasol every other day at most. No itchy spots. No joint pains or stiffness in the morning in hands, back. Otherwise no new, tender, non-healing, burning, bleeding, tingling, pruritic lesions. Denies fevers, chills, unintended weight loss, night sweats, energy changes, new headaches or vision changes. No injection site issues or reactions. No other concerns today.    Labs Reviewed:  N/A    Physical Exam:  Vitals: There were no vitals taken for this visit.  SKIN: Waist-up skin, which includes the head/face, neck, both arms, chest, back, abdomen, digits and/or nails was examined, as well as the bilateral shins and knees.  - Lightly scaly light valencia pink fading 2 cm nummular plaque on L volar forearm  - Nummular to polycylic light tan hyperpigmented patches on bilateral arms, chest, abdomen, knees, shins in areas of prior psoriasis involvement  - No other lesions of concern on areas examined.     Medications:  Current Outpatient Medications   Medication Sig Dispense Refill    cholecalciferol (VITAMIN D3) 125 mcg (5000 units) capsule Take 1 capsule (125 mcg) by mouth daily. 90 capsule 0    clobetasol (TEMOVATE) 0.05 % external ointment Apply twice a day FIVE days of the week, skin needs a 2 day break 45 g 1    Risankizumab-rzaa (SKYRIZI) 150 MG/ML subcutaneous Inject 1 mL (150 mg) subcutaneously See Admin Instructions. - 1 injection on day 0, 1 injection on day 28, then 1 injection every 12 weeks 1 mL 1    Risankizumab-rzaa (SKYRIZI) 150 MG/ML subcutaneous Inject 1 mL (150 mg) subcutaneously every 3 months. 1 mL 2    triamcinolone (KENALOG) 0.1 % external ointment Apply topically 2 times daily (Patient not taking: Reported on 1/6/2025) 454 g 3     Current Facility-Administered Medications   Medication Dose Route Frequency Provider Last Rate Last Admin    triamcinolone acetonide  (KENALOG-10) injection 10 mg  10 mg Intra-Lesional Once Jena Merchant MD        triamcinolone acetonide (KENALOG-10) injection 20 mg  20 mg Intra-Lesional Once Dorothy Davis MD        triamcinolone acetonide (KENALOG-10) injection 20 mg  20 mg Intra-Lesional Once Dorothy Davis MD        triamcinolone acetonide (KENALOG-10) injection 30 mg  30 mg Intra-Lesional Once Trent Zavala MD        triamcinolone acetonide (KENALOG-10) injection 30 mg  30 mg Intra-Lesional Once Dorothy Davis MD        triamcinolone acetonide (KENALOG-10) injection 40 mg  40 mg Intra-Lesional Once Kristal Floyd MD        triamcinolone acetonide (KENALOG-10) injection 40 mg  40 mg Intra-Lesional Once Dorothy Davis MD          Past Medical History:   Patient Active Problem List   Diagnosis    ADHD (attention deficit hyperactivity disorder)    Alopecia areata    Anxiety    Major depressive disorder, single episode, mild (H)    Suicidal ideation     History reviewed. No pertinent past medical history.    CC Referred MD Sarath  No address on file on close of this encounter.

## 2025-01-06 NOTE — PATIENT INSTRUCTIONS
"    Continue your risankizumab injections        Risankizumab: Patient drug information   Access "[employer] Online for additional drug information, tools, and databases.   Copyright 0835-7483 [employer] All rights reserved.   Contributor Disclosures  (For additional information see \"Risankizumab: Drug information\")    You must carefully read the \"Consumer Information Use and Disclaimer\" below in order to understand and correctly use this information.  Brand Names: [place]   Skyrizi;   Skyrizi (150 MG Dose);   Skyrizi Pen  Brand Names: Nicole   Skyrizi  What is this drug used for?    It is used to treat plaque psoriasis.    It is used to treat psoriatic arthritis.  What do I need to tell my doctor BEFORE I take this drug?    If you are allergic to this drug; any part of this drug; or any other drugs, foods, or substances. Tell your doctor about the allergy and what signs you had.    If you have an infection.    If you have active TB (tuberculosis).    If you have recently had a live vaccine.   This is not a list of all drugs or health problems that interact with this drug.   Tell your doctor and pharmacist about all of your drugs (prescription or OTC, natural products, vitamins) and health problems. You must check to make sure that it is safe for you to take this drug with all of your drugs and health problems. Do not start, stop, or change the dose of any drug without checking with your doctor.  What are some things I need to know or do while I take this drug?    Tell all of your health care providers that you take this drug. This includes your doctors, nurses, pharmacists, and dentists.    You may have more chance of getting an infection. Wash hands often. Stay away from people with infections, colds, or flu.    You will need a TB (tuberculosis) test before starting this drug.    Make sure you are up to date with all your vaccines before treatment with this drug.    Talk with your doctor before getting any vaccines " right before you start this drug, while you take it, and right after you stop taking it. Vaccine use with this drug may either raise the chance of an infection or make the vaccine not work as well.    Tell your doctor if you are pregnant, plan on getting pregnant, or are breast-feeding. You will need to talk about the benefits and risks to you and the baby.  What are some side effects that I need to call my doctor about right away?   WARNING/CAUTION: Even though it may be rare, some people may have very bad and sometimes deadly side effects when taking a drug. Tell your doctor or get medical help right away if you have any of the following signs or symptoms that may be related to a very bad side effect:    Signs of an allergic reaction, like rash; hives; itching; red, swollen, blistered, or peeling skin with or without fever; wheezing; tightness in the chest or throat; trouble breathing, swallowing, or talking; unusual hoarseness; or swelling of the mouth, face, lips, tongue, or throat.    Signs of infection like fever, chills, very bad sore throat, ear or sinus pain, cough, more sputum or change in color of sputum, pain with passing urine, mouth sores, or wound that will not heal.    Flu-like signs.    Shortness of breath.    Weight loss.    Warm, red, or painful skin or sores on the body.    Muscle aches.    Severe diarrhea.    Very bad belly pain.  What are some other side effects of this drug?   All drugs may cause side effects. However, many people have no side effects or only have minor side effects. Call your doctor or get medical help if any of these side effects or any other side effects bother you or do not go away:    Signs of a common cold.    Feeling tired or weak.    Irritation where the shot is given.    Headache.   These are not all of the side effects that may occur. If you have questions about side effects, call your doctor. Call your doctor for medical advice about side effects.   You may report  side effects to your national health agency.  How is this drug best taken?   Use this drug as ordered by your doctor. Read all information given to you. Follow all instructions closely.   All products:    It is given as a shot into the fatty part of the skin on the top of the thigh or the belly area.    This drug may be given into the outer area of the upper arm if given by someone else.    If you will be giving yourself the shot, your doctor or nurse will teach you how to give the shot.    Take this drug out of the refrigerator and let it come to room temperature before you use it. Be sure you know how long to leave it at room temperature. Do not heat or microwave.    Keep out of direct sunlight.    Do not shake.    Do not remove the cap or cover until ready to use.    Do not use this drug if it has been dropped or if it is broken.    This product is clear but may contain small white or clear particles. You may also see one or more air bubbles. Do not use if the solution is cloudy, leaking, or has large particles.    This drug is colorless to a faint yellow. Do not use if the solution changes color.    Do not give into skin that is irritated, tender, bruised, red, scaly, hard, scarred, or has stretch marks.    Do not give into skin that is affected by psoriasis.    Do not give into skin within 2 inches of the belly button.    Do not inject through clothes.    Each prefilled pen or syringe is for one use only.    Throw away needles in a needle/sharp disposal box. Do not reuse needles or other items. When the box is full, follow all local rules for getting rid of it. Talk with a doctor or pharmacist if you have any questions.   Prefilled syringes:    If the dose is more than 1 injection, give the injections into 2 different places.  What do I do if I miss a dose?    Take a missed dose as soon as you think about it and go back to your normal time.    Do not take 2 doses at the same time or extra doses.    If you are  not sure what to do if you miss a dose, call your doctor.  How do I store and/or throw out this drug?    Store in a refrigerator. Do not freeze.    Do not use if it has been frozen.    Store in the original container to protect from light.    Keep all drugs in a safe place. Keep all drugs out of the reach of children and pets.    Throw away unused or  drugs. Do not flush down a toilet or pour down a drain unless you are told to do so. Check with your pharmacist if you have questions about the best way to throw out drugs. There may be drug take-back programs in your area.  General drug facts    If your symptoms or health problems do not get better or if they become worse, call your doctor.    Do not share your drugs with others and do not take anyone else's drugs.    Some drugs may have another patient information leaflet. If you have any questions about this drug, please talk with your doctor, nurse, pharmacist, or other health care provider.    If you think there has been an overdose, call your poison control center or get medical care right away. Be ready to tell or show what was taken, how much, and when it happened.  Last Reviewed [id number]   Consumer Information Use and Disclaimer   This generalized information is a limited summary of diagnosis, treatment, and/or medication information. It is not meant to be comprehensive and should be used as a tool to help the user understand and/or assess potential diagnostic and treatment options. It does NOT include all information about conditions, treatments, medications, side effects, or risks that may apply to a specific patient. It is not intended to be medical advice or a substitute for the medical advice, diagnosis, or treatment of a health care provider based on the health care provider's examination and assessment of a patient's specific and unique circumstances. Patients must speak with a health care provider for complete information about their  health, medical questions, and treatment options, including any risks or benefits regarding use of medications. This information does not endorse any treatments or medications as safe, effective, or approved for treating a specific patient. UpToDate, Inc. and its affiliates disclaim any warranty or liability relating to this information or the use thereof. The use of this information is governed by the Terms of Use, available at https://www.[employer].com/en/know/clinical-effectiveness-terms.    2022 UpToDate, Inc. and its affiliates and/or licensors. All rights reserved.  Use of You[employer]Do is subject to the Terms of Use.   Topic 075988 Version 28.0

## 2025-01-06 NOTE — NURSING NOTE
Dermatology Rooming Note    Sulaiman Mccray's goals for this visit include:   Chief Complaint   Patient presents with    Derm Problem     Feels like things have improved. Since last visit.      Shyam Humphrey, EMT  Clinic Support  [facility]     [phone]    Employed by [employer] [facility] [employer] [facility] [employer]

## 2025-01-06 NOTE — PROGRESS NOTES
Medication Therapy Management (MTM) Encounter    ASSESSMENT:                            Psoriasis: Significant improvement on Skyrizi so far, BSA improved and patient is tolerating the medication well. Next dose is due on 3/28/25. PDC indicates no adherence issues. I strongly encouraged vaccines, especially he is overdue for a Tdap vaccine.  In addition I would recommend influenza and COVID-19.    Vitamin D deficiency: Last vitamin D level was low, I encouraged him to continue 5000 units daily -- refills sent. Will recheck vitamin D level in the future to ensure adequate supplementation.    PLAN:                            1.  Continue Skyrizi 150 mg every 12 weeks (next dose due 3/28/25)  3.  Vaccines to consider: Tdap, influenza, COVID.  4.  Restart vitamin D 5000 units daily -- Rx sent to [facility] mail order    Follow-up: 6 months [facility] follow up    SUBJECTIVE/OBJECTIVE:                          Sulaiman Mccray is a 24 year old male coming in for a follow up visit. He was referred to me from Dr. Floyd and was seen prior to [facility] appt. His mother, Natalie, joins us today.     Reason for visit: Skyriz for psorasis follow up  Medication Adherence/Access: Patient was familiar with his medications, and noted no concerns.    Psoriasis:   -Skyrizi 150 mg SQ every 12 weeks ([place] approved -- [facility]. $0 copay)  -Clobetasol 0.05% ointment twice daily as needed  -Triamcinolone 0.1% ointment as needed  Since last MTM appt patient was able to successfully switch from Cosentyx to Skyrizi. First dose given on 9/23/24. Last dose given on 1/3/25.   Today he reports that he is doing very well except for a small patch on his right arm, which he is using clobetasol.   BSA on 1/6/25 <1% (baseline was ~60%)  No joint symptoms.  Previous Tx: Enbrel 50mg (effective for psoriasis. 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   Reports improvement in hair and scalp since starting     Pre-Biologic Screening:  -- Hep B Surface Antibody nonreactive on 6/2/2022  -- Hep B Surface Antigen nonreactive on 6/2/2022  -- Hep B Core Antibody nonreactive on 6/2/2022  -- Hep C Antibody nonreactive on 6/2/2022  -- QuantiFERON-Tb: Negative on 7/19/2024    Vaccinations:  All patients on biologics should avoid live vaccines (varicella/VZV, intranasal influenza, MMR, or yellow fever vaccine (if traveling))    -- Influenza (every year): Last in 2018  -- TdaP (every 10 years): 11/4/2013  -- Pneumococcal Pneumonia: Prevnar 7 history on file   Prevnar-13:   Pneumovax-23:   Prevnar-20:  -- COVID-19: Last 1/10/2022  -- RSV: n/a  -- Varicella/Zoster    Varicella: 2/7/2002 (1 dose only)   Zoster:    [employer] Documentation   Mental health status  In general, how would you rate your mental health, including your mood and your ability to think? 5 (excellent)   Adherence (PDC) 0.96     Dermatology Life Quality Index (DLQI)   Over the last week, how itchy, sore, painful or stinging has your skin been? Not at all (0 pts)    Over the last week, how much has your skin interfered with your ability to do activities of daily living (shopping, cleaning, working, studying, sports)? Not at all (0 pts)    Based on the patient's quality of life, please rate the patient's current disease severity: Mild (minimal effect on quality of life, minimal body surface area affected).       Vitamin D Deficiency:  Dr. Davis recommended 5000 units daily -he is not taking any vitamin D at this time because he ran out of refills.  Denies any symptoms of vitamin D deficiency.  Lab Results   Component Value Date    VITDT 14 (L) 07/19/2024    VITDT 12 (L) 02/02/2023    VITDT 12 (L) 10/22/2020       Today's Vitals: There were no vitals taken for this visit.    Allergies/ADRs: Reviewed in chart  Past Medical History: Reviewed in chart  Tobacco: He reports  that he has never smoked. He has never used smokeless tobacco.  Alcohol: Reviewed in chart    ----------------      I spent 10 minutes with this patient today. All changes were made via collaborative practice agreement with Kristal Floyd. A copy of the visit note was provided to the patient's provider(s).    A summary of these recommendations was sent via [employer].    Linh Donato, Pharm.D., BCACP   Medication Therapy Management Pharmacist   [facility]     Medication Therapy Recommendations  No medication therapy recommendations to display

## 2025-03-28 ENCOUNTER — APPOINTMENT (OUTPATIENT)
Dept: INTERPRETER SERVICES | Facility: CLINIC | Age: 25
End: 2025-03-28
Payer: COMMERCIAL

## 2025-07-19 ENCOUNTER — HEALTH MAINTENANCE LETTER (OUTPATIENT)
Age: 25
End: 2025-07-19

## 2025-09-02 ENCOUNTER — VIRTUAL VISIT (OUTPATIENT)
Dept: PHARMACY | Facility: CLINIC | Age: 25
End: 2025-09-02
Attending: DERMATOLOGY
Payer: COMMERCIAL

## 2025-09-02 DIAGNOSIS — R79.89 LOW VITAMIN D LEVEL: ICD-10-CM

## 2025-09-02 DIAGNOSIS — L40.0 PLAQUE PSORIASIS: Primary | ICD-10-CM
